# Patient Record
Sex: MALE | Race: WHITE | NOT HISPANIC OR LATINO | Employment: OTHER | URBAN - METROPOLITAN AREA
[De-identification: names, ages, dates, MRNs, and addresses within clinical notes are randomized per-mention and may not be internally consistent; named-entity substitution may affect disease eponyms.]

---

## 2017-01-17 ENCOUNTER — TELEPHONE (OUTPATIENT)
Dept: RHEUMATOLOGY | Facility: CLINIC | Age: 57
End: 2017-01-17

## 2017-01-17 DIAGNOSIS — G47.00 INSOMNIA: ICD-10-CM

## 2017-01-17 DIAGNOSIS — E87.6 LOW BLOOD POTASSIUM: ICD-10-CM

## 2017-01-17 DIAGNOSIS — Z86.39 H/O NON ANEMIC VITAMIN B12 DEFICIENCY: ICD-10-CM

## 2017-01-17 DIAGNOSIS — I10 ESSENTIAL HYPERTENSION: ICD-10-CM

## 2017-01-17 DIAGNOSIS — M06.9 RHEUMATOID ARTHRITIS OF HAND, UNSPECIFIED LATERALITY, UNSPECIFIED RHEUMATOID FACTOR PRESENCE: ICD-10-CM

## 2017-01-17 NOTE — TELEPHONE ENCOUNTER
----- Message from Oliverio Burr sent at 1/17/2017  1:53 PM CST -----  Contact: self   Patient wants to speak with a patient regarding earlier appointment please call back at [phone]

## 2017-01-17 NOTE — TELEPHONE ENCOUNTER
Returned patient's call. Patient scheduled for earlier appointment. Cancellation spot came available.

## 2017-01-18 ENCOUNTER — OFFICE VISIT (OUTPATIENT)
Dept: RHEUMATOLOGY | Facility: CLINIC | Age: 57
End: 2017-01-18
Payer: MEDICARE

## 2017-01-18 VITALS
HEART RATE: 109 BPM | HEIGHT: 66 IN | WEIGHT: 182.13 LBS | DIASTOLIC BLOOD PRESSURE: 87 MMHG | BODY MASS INDEX: 29.27 KG/M2 | SYSTOLIC BLOOD PRESSURE: 129 MMHG

## 2017-01-18 DIAGNOSIS — M06.9 RHEUMATOID ARTHRITIS OF HAND, UNSPECIFIED LATERALITY, UNSPECIFIED RHEUMATOID FACTOR PRESENCE: Primary | ICD-10-CM

## 2017-01-18 DIAGNOSIS — T40.2X5A CONSTIPATION DUE TO OPIOID THERAPY: ICD-10-CM

## 2017-01-18 DIAGNOSIS — R14.0 ABDOMINAL DISTENSION (GASEOUS): ICD-10-CM

## 2017-01-18 DIAGNOSIS — K52.9 GASTROENTERITIS: ICD-10-CM

## 2017-01-18 DIAGNOSIS — K59.03 CONSTIPATION DUE TO OPIOID THERAPY: ICD-10-CM

## 2017-01-18 DIAGNOSIS — R19.5 YEAST IN STOOL: ICD-10-CM

## 2017-01-18 PROCEDURE — 96372 THER/PROPH/DIAG INJ SC/IM: CPT | Mod: PBBFAC,PO

## 2017-01-18 PROCEDURE — 99999 PR PBB SHADOW E&M-EST. PATIENT-LVL III: CPT | Mod: PBBFAC,,, | Performed by: INTERNAL MEDICINE

## 2017-01-18 PROCEDURE — 99215 OFFICE O/P EST HI 40 MIN: CPT | Mod: 25,S$PBB,, | Performed by: INTERNAL MEDICINE

## 2017-01-18 PROCEDURE — 99213 OFFICE O/P EST LOW 20 MIN: CPT | Mod: PBBFAC,PO | Performed by: INTERNAL MEDICINE

## 2017-01-18 RX ORDER — SUCRALFATE 1 G/10ML
2 SUSPENSION ORAL DAILY
COMMUNITY
Start: 2016-12-10 | End: 2017-06-07

## 2017-01-18 RX ORDER — LUBIPROSTONE 24 UG/1
24 CAPSULE ORAL
Qty: 30 CAPSULE | Refills: 11 | Status: SHIPPED | OUTPATIENT
Start: 2017-01-18 | End: 2017-02-17

## 2017-01-18 RX ORDER — LANOLIN ALCOHOL/MO/W.PET/CERES
400 CREAM (GRAM) TOPICAL DAILY
Status: ON HOLD | COMMUNITY
Start: 2016-12-10 | End: 2017-08-13

## 2017-01-18 RX ORDER — TIZANIDINE 4 MG/1
4 TABLET ORAL EVERY 12 HOURS
Qty: 60 TABLET | Refills: 11 | Status: SHIPPED | OUTPATIENT
Start: 2017-01-18 | End: 2018-02-07 | Stop reason: SDUPTHER

## 2017-01-18 RX ORDER — ALBUTEROL SULFATE 90 UG/1
2 AEROSOL, METERED RESPIRATORY (INHALATION)
COMMUNITY
Start: 2015-04-06 | End: 2017-01-18

## 2017-01-18 RX ORDER — BIOTIN 10 MG
1 TABLET ORAL DAILY
Qty: 30 CAPSULE | Refills: 3 | Status: SHIPPED | OUTPATIENT
Start: 2017-01-18 | End: 2018-09-17

## 2017-01-18 RX ORDER — FLUCONAZOLE 150 MG/1
150 TABLET ORAL WEEKLY
Qty: 4 TABLET | Refills: 3 | Status: SHIPPED | OUTPATIENT
Start: 2017-01-18 | End: 2017-01-22

## 2017-01-18 RX ORDER — CYANOCOBALAMIN 1000 UG/ML
1000 INJECTION, SOLUTION INTRAMUSCULAR; SUBCUTANEOUS
Qty: 10 ML | Refills: 3 | Status: SHIPPED | OUTPATIENT
Start: 2017-01-18 | End: 2018-03-19 | Stop reason: SDUPTHER

## 2017-01-18 RX ORDER — CYANOCOBALAMIN 1000 UG/ML
1000 INJECTION, SOLUTION INTRAMUSCULAR; SUBCUTANEOUS
Status: COMPLETED | OUTPATIENT
Start: 2017-01-18 | End: 2017-01-18

## 2017-01-18 RX ORDER — CYANOCOBALAMIN 1000 UG/ML
1000 INJECTION, SOLUTION INTRAMUSCULAR; SUBCUTANEOUS WEEKLY
COMMUNITY
End: 2017-09-13 | Stop reason: SDUPTHER

## 2017-01-18 RX ORDER — ZOLPIDEM TARTRATE 10 MG/1
TABLET ORAL
Qty: 30 TABLET | Refills: 3 | Status: SHIPPED | OUTPATIENT
Start: 2017-01-18 | End: 2017-05-03 | Stop reason: SDUPTHER

## 2017-01-18 RX ORDER — METHYLPREDNISOLONE ACETATE 80 MG/ML
80 INJECTION, SUSPENSION INTRA-ARTICULAR; INTRALESIONAL; INTRAMUSCULAR; SOFT TISSUE
Status: COMPLETED | OUTPATIENT
Start: 2017-01-18 | End: 2017-01-18

## 2017-01-18 RX ORDER — FLUTICASONE FUROATE AND VILANTEROL TRIFENATATE 100; 25 UG/1; UG/1
1 POWDER RESPIRATORY (INHALATION) DAILY
COMMUNITY
Start: 2016-12-12

## 2017-01-18 RX ORDER — POTASSIUM CHLORIDE 750 MG/1
10 TABLET, EXTENDED RELEASE ORAL DAILY
Qty: 30 TABLET | Refills: 6 | Status: SHIPPED | OUTPATIENT
Start: 2017-01-18 | End: 2017-08-14 | Stop reason: SDUPTHER

## 2017-01-18 RX ORDER — ONDANSETRON 8 MG/1
8 TABLET, ORALLY DISINTEGRATING ORAL EVERY 8 HOURS PRN
Qty: 45 TABLET | Refills: 6 | Status: SHIPPED | OUTPATIENT
Start: 2017-01-18 | End: 2019-03-07 | Stop reason: SDUPTHER

## 2017-01-18 RX ORDER — METOCLOPRAMIDE 5 MG/1
5 TABLET ORAL NIGHTLY
COMMUNITY
Start: 2016-12-30 | End: 2017-06-07 | Stop reason: SDUPTHER

## 2017-01-18 RX ADMIN — METHYLPREDNISOLONE ACETATE 80 MG: 80 INJECTION, SUSPENSION INTRA-ARTICULAR; INTRALESIONAL; INTRAMUSCULAR; SOFT TISSUE at 10:01

## 2017-01-18 RX ADMIN — CYANOCOBALAMIN 1000 MCG: 1000 INJECTION, SOLUTION INTRAMUSCULAR at 10:01

## 2017-01-18 NOTE — PROGRESS NOTES
Subjective:       Patient ID: Anoop Womack is a 56 y.o. male.    Chief Complaint: Follow-up (on labs)    HPI Comments:   On humira, he has gastroparesis and admitted x 3  He had nausea, vomiting and ill feeling. Patient complains of arthralgias and myalgias for which has been present for a few years. Pain is located in multiple joints, both shoulder(s), both elbow(s), both wrist(s), both MCP(s): 1st, 2nd, 3rd, 4th and 5th, both PIP(s): 1st, 2nd, 3rd, 4th and 5th, both DIP(s): 1st and 2nd, both hip(s), both knee(s) and both MTP(s): 1st, 2nd, 3rd, 4th and 5th, is described as aching, pulsating, shooting and throbbing, and is constant, moderate .  Associated symptoms include: crepitation, decreased range of motion, edema, effusion, tenderness and warmth.            Rheumatoid Arthritis    The disease course has been fluctuating. The condition has lasted for 10 years.     He complains of joint swelling. He complains of lasting between 30 and 60 minutes after awakening.The left knee and left ankle presents with Arthralgia.    Past treatments include methotrexate, NSAIDs and corticosteroids (azulfidine, plaquenil, enbrel, remicade). The treatment provided moderate relief. Compliance with prior treatments has been good.         Rheumatoid Arthritis        He complains of joint swelling. He complains of lasting between 30 and 60 minutes after awakening.The left knee and left ankle presents with Arthralgia.        Review of Systems   Constitutional: Positive for activity change. Negative for appetite change, chills, diaphoresis and unexpected weight change.   HENT: Negative for congestion, ear pain, facial swelling, mouth sores, nosebleeds, postnasal drip, rhinorrhea, sinus pressure, sneezing, sore throat, tinnitus and voice change.    Eyes: Negative for pain, discharge, redness, itching and visual disturbance.   Respiratory: Negative for apnea, cough, chest tightness, shortness of breath and wheezing.    Cardiovascular:  "Negative for chest pain, palpitations and leg swelling.   Gastrointestinal: Positive for abdominal distention, abdominal pain, constipation, diarrhea, nausea and vomiting.   Endocrine: Negative for cold intolerance, heat intolerance, polydipsia and polyuria.   Genitourinary: Negative for decreased urine volume, difficulty urinating, flank pain, frequency, hematuria and urgency.   Musculoskeletal: Positive for back pain, gait problem, joint swelling, neck pain and neck stiffness. Negative for arthralgias.   Skin: Positive for rash. Negative for pallor and wound.   Allergic/Immunologic: Negative for immunocompromised state.   Neurological: Negative for dizziness, tremors, seizures, syncope, weakness and numbness.   Hematological: Negative for adenopathy. Does not bruise/bleed easily.   Psychiatric/Behavioral: Negative for sleep disturbance and suicidal ideas. The patient is not nervous/anxious.          Objective:     Visit Vitals    /87    Pulse 109    Ht 5' 6" (1.676 m)    Wt 82.6 kg (182 lb 1.6 oz)    BMI 29.39 kg/m2        Physical Exam   Vitals reviewed.  Constitutional: He is oriented to person, place, and time and well-developed, well-nourished, and in no distress.   HENT:   Head: Normocephalic and atraumatic.   Mouth/Throat: Oropharynx is clear and moist.   Eyes: EOM are normal. Pupils are equal, round, and reactive to light.   Neck: Neck supple. No thyromegaly present.   Cardiovascular: Normal rate, regular rhythm and normal heart sounds.  Exam reveals no gallop and no friction rub.    No murmur heard.  Pulmonary/Chest: Breath sounds normal. He has no wheezes. He has no rales. He exhibits no tenderness.   Abdominal: There is no tenderness. There is no rebound and no guarding.       Right Side Rheumatological Exam     The patient is tender to palpation of the shoulder, elbow, wrist, knee, 1st PIP, 1st MCP, 2nd PIP, 2nd MCP, 3rd PIP, 3rd MCP, 4th PIP, 4th MCP and 5th PIP    He has swelling of the " elbow, wrist, knee, 1st PIP, 1st MCP, 2nd PIP, 2nd MCP, 3rd PIP, 3rd MCP, 4th PIP, 4th MCP, 5th PIP and 5th MCP    The patient has an enlarged wrist and knee    Shoulder Exam   Tenderness Location: no tenderness    Range of Motion   Active Abduction: abnormal   Adduction: abnormal  Sensation: normal    Knee Exam   Tenderness Location: medial joint line and LCL  Patellofemoral Crepitus: positive  Effusion: positive  Sensation: normal    Hip Exam   Tenderness Location: posterior and anterior  Sensation: normal    Elbow/Wrist Exam   Tenderness Location: no tenderness  Sensation: normal    Left Side Rheumatological Exam     The patient is tender to palpation of the shoulder, elbow, wrist, knee, 1st PIP, 1st MCP, 2nd PIP, 2nd MCP, 3rd PIP, 3rd MCP, 4th PIP, 4th MCP, 5th PIP and 5th MCP.    He has swelling of the wrist, knee, 1st PIP, 1st MCP, 2nd PIP, 2nd MCP, 3rd PIP, 3rd MCP, 4th PIP, 4th MCP, 5th PIP and 5th MCP    The patient has an enlarged knee.    Shoulder Exam   Tenderness Location: no tenderness    Range of Motion   Active Abduction: abnormal   Sensation: normal    Knee Exam   Tenderness Location: lateral joint line and medial joint line    Patellofemoral Crepitus: positive  Effusion: positive  Sensation: normal    Hip Exam   Tenderness Location: posterior and anterior  Sensation: normal    Elbow/Wrist Exam   Sensation: normal      Back/Neck Exam   General Inspection   Gait: normal       Tenderness Right paramedian tenderness of the Upper C-Spine, Lower C-Spine, Lower L-Spine and SI Joint.Left paramedian tenderness of the Upper C-Spine, Lower L-Spine, Lower C-Spine and SI Joint.    Neck Range of Motion   Flexion: Limited  Extension: Limited  Lymphadenopathy:     He has no cervical adenopathy.   Neurological: He is alert and oriented to person, place, and time. Gait normal.   Skin: No rash noted. No erythema. No pallor.     Psychiatric: Mood and affect normal.   Musculoskeletal: He exhibits tenderness and  deformity. He exhibits no edema.               Results for orders placed or performed in visit on 08/08/16   Quantiferon Gold TB   Result Value Ref Range    NIL 0.06 See text IU/mL    TB Antigen 0.05 See text IU/mL    TB Antigen - Nil -0.01 See text IU/mL    Mitogen - Nil 1.72 See text IU/mL    TB Gold Negative        Assessment:         Encounter Diagnoses   Name Primary?    Rheumatoid arthritis of hand, unspecified laterality, unspecified rheumatoid factor presence Yes    Gastroenteritis     Abdominal distension (gaseous)     Constipation due to opioid therapy     Yeast in stool          Plan:       Rheumatoid arthritis of hand, unspecified laterality, unspecified rheumatoid factor presence  -     lubiprostone (AMITIZA) 24 MCG Cap; Take 1 capsule (24 mcg total) by mouth daily with breakfast.  Dispense: 30 capsule; Refill: 11  -     ondansetron (ZOFRAN-ODT) 8 MG TbDL; Take 1 tablet (8 mg total) by mouth every 8 (eight) hours as needed.  Dispense: 45 tablet; Refill: 6  -     tizanidine (ZANAFLEX) 4 MG tablet; Take 1 tablet (4 mg total) by mouth every 12 (twelve) hours.  Dispense: 60 tablet; Refill: 11  -     fluconazole (DIFLUCAN) 150 MG Tab; Take 1 tablet (150 mg total) by mouth once a week. Treat yeast  Dispense: 4 tablet; Refill: 3  -     potassium chloride SA (K-DUR,KLOR-CON) 10 MEQ tablet; Take 1 tablet (10 mEq total) by mouth once daily.  Dispense: 30 tablet; Refill: 6  -     cyanocobalamin 1,000 mcg/mL injection; Inject 1 mL (1,000 mcg total) into the skin every 7 days.  Dispense: 10 mL; Refill: 3  -     acidophilus-pectin, citrus (PROBIOTIC ACIDOPHILUS-PECTIN) 100 million cell-10 mg Cap; Take 1 tablet by mouth once daily.  Dispense: 30 capsule; Refill: 3  -     methylPREDNISolone acetate injection 80 mg; Inject 1 mL (80 mg total) into the muscle one time.  -     cyanocobalamin injection 1,000 mcg; Inject 1 mL (1,000 mcg total) into the muscle one time.  -     Comprehensive metabolic panel; Future;  Expected date: 1/18/17  -     CBC auto differential; Future; Expected date: 1/18/17  -     Sedimentation rate, manual; Future; Expected date: 1/18/17  -     C-reactive protein; Future; Expected date: 1/18/17  -     Rheumatoid factor; Future; Expected date: 1/18/17  -     Cyclic citrul peptide antibody, IgG; Future; Expected date: 1/18/17    Gastroenteritis  -     lubiprostone (AMITIZA) 24 MCG Cap; Take 1 capsule (24 mcg total) by mouth daily with breakfast.  Dispense: 30 capsule; Refill: 11  -     ondansetron (ZOFRAN-ODT) 8 MG TbDL; Take 1 tablet (8 mg total) by mouth every 8 (eight) hours as needed.  Dispense: 45 tablet; Refill: 6  -     tizanidine (ZANAFLEX) 4 MG tablet; Take 1 tablet (4 mg total) by mouth every 12 (twelve) hours.  Dispense: 60 tablet; Refill: 11  -     fluconazole (DIFLUCAN) 150 MG Tab; Take 1 tablet (150 mg total) by mouth once a week. Treat yeast  Dispense: 4 tablet; Refill: 3  -     potassium chloride SA (K-DUR,KLOR-CON) 10 MEQ tablet; Take 1 tablet (10 mEq total) by mouth once daily.  Dispense: 30 tablet; Refill: 6  -     cyanocobalamin 1,000 mcg/mL injection; Inject 1 mL (1,000 mcg total) into the skin every 7 days.  Dispense: 10 mL; Refill: 3  -     acidophilus-pectin, citrus (PROBIOTIC ACIDOPHILUS-PECTIN) 100 million cell-10 mg Cap; Take 1 tablet by mouth once daily.  Dispense: 30 capsule; Refill: 3  -     methylPREDNISolone acetate injection 80 mg; Inject 1 mL (80 mg total) into the muscle one time.  -     cyanocobalamin injection 1,000 mcg; Inject 1 mL (1,000 mcg total) into the muscle one time.  -     Comprehensive metabolic panel; Future; Expected date: 1/18/17  -     CBC auto differential; Future; Expected date: 1/18/17  -     Sedimentation rate, manual; Future; Expected date: 1/18/17  -     C-reactive protein; Future; Expected date: 1/18/17  -     Rheumatoid factor; Future; Expected date: 1/18/17  -     Cyclic citrul peptide antibody, IgG; Future; Expected date: 1/18/17    Abdominal  distension (gaseous)  -     lubiprostone (AMITIZA) 24 MCG Cap; Take 1 capsule (24 mcg total) by mouth daily with breakfast.  Dispense: 30 capsule; Refill: 11  -     ondansetron (ZOFRAN-ODT) 8 MG TbDL; Take 1 tablet (8 mg total) by mouth every 8 (eight) hours as needed.  Dispense: 45 tablet; Refill: 6  -     tizanidine (ZANAFLEX) 4 MG tablet; Take 1 tablet (4 mg total) by mouth every 12 (twelve) hours.  Dispense: 60 tablet; Refill: 11  -     fluconazole (DIFLUCAN) 150 MG Tab; Take 1 tablet (150 mg total) by mouth once a week. Treat yeast  Dispense: 4 tablet; Refill: 3  -     potassium chloride SA (K-DUR,KLOR-CON) 10 MEQ tablet; Take 1 tablet (10 mEq total) by mouth once daily.  Dispense: 30 tablet; Refill: 6  -     cyanocobalamin 1,000 mcg/mL injection; Inject 1 mL (1,000 mcg total) into the skin every 7 days.  Dispense: 10 mL; Refill: 3  -     acidophilus-pectin, citrus (PROBIOTIC ACIDOPHILUS-PECTIN) 100 million cell-10 mg Cap; Take 1 tablet by mouth once daily.  Dispense: 30 capsule; Refill: 3  -     methylPREDNISolone acetate injection 80 mg; Inject 1 mL (80 mg total) into the muscle one time.  -     cyanocobalamin injection 1,000 mcg; Inject 1 mL (1,000 mcg total) into the muscle one time.  -     Comprehensive metabolic panel; Future; Expected date: 1/18/17  -     CBC auto differential; Future; Expected date: 1/18/17  -     Sedimentation rate, manual; Future; Expected date: 1/18/17  -     C-reactive protein; Future; Expected date: 1/18/17  -     Rheumatoid factor; Future; Expected date: 1/18/17  -     Cyclic citrul peptide antibody, IgG; Future; Expected date: 1/18/17    Constipation due to opioid therapy  -     lubiprostone (AMITIZA) 24 MCG Cap; Take 1 capsule (24 mcg total) by mouth daily with breakfast.  Dispense: 30 capsule; Refill: 11  -     ondansetron (ZOFRAN-ODT) 8 MG TbDL; Take 1 tablet (8 mg total) by mouth every 8 (eight) hours as needed.  Dispense: 45 tablet; Refill: 6  -     tizanidine (ZANAFLEX) 4  MG tablet; Take 1 tablet (4 mg total) by mouth every 12 (twelve) hours.  Dispense: 60 tablet; Refill: 11  -     fluconazole (DIFLUCAN) 150 MG Tab; Take 1 tablet (150 mg total) by mouth once a week. Treat yeast  Dispense: 4 tablet; Refill: 3  -     potassium chloride SA (K-DUR,KLOR-CON) 10 MEQ tablet; Take 1 tablet (10 mEq total) by mouth once daily.  Dispense: 30 tablet; Refill: 6  -     cyanocobalamin 1,000 mcg/mL injection; Inject 1 mL (1,000 mcg total) into the skin every 7 days.  Dispense: 10 mL; Refill: 3  -     acidophilus-pectin, citrus (PROBIOTIC ACIDOPHILUS-PECTIN) 100 million cell-10 mg Cap; Take 1 tablet by mouth once daily.  Dispense: 30 capsule; Refill: 3  -     methylPREDNISolone acetate injection 80 mg; Inject 1 mL (80 mg total) into the muscle one time.  -     cyanocobalamin injection 1,000 mcg; Inject 1 mL (1,000 mcg total) into the muscle one time.  -     Comprehensive metabolic panel; Future; Expected date: 1/18/17  -     CBC auto differential; Future; Expected date: 1/18/17  -     Sedimentation rate, manual; Future; Expected date: 1/18/17  -     C-reactive protein; Future; Expected date: 1/18/17  -     Rheumatoid factor; Future; Expected date: 1/18/17  -     Cyclic citrul peptide antibody, IgG; Future; Expected date: 1/18/17    Yeast in stool  -     ondansetron (ZOFRAN-ODT) 8 MG TbDL; Take 1 tablet (8 mg total) by mouth every 8 (eight) hours as needed.  Dispense: 45 tablet; Refill: 6  -     tizanidine (ZANAFLEX) 4 MG tablet; Take 1 tablet (4 mg total) by mouth every 12 (twelve) hours.  Dispense: 60 tablet; Refill: 11  -     fluconazole (DIFLUCAN) 150 MG Tab; Take 1 tablet (150 mg total) by mouth once a week. Treat yeast  Dispense: 4 tablet; Refill: 3  -     potassium chloride SA (K-DUR,KLOR-CON) 10 MEQ tablet; Take 1 tablet (10 mEq total) by mouth once daily.  Dispense: 30 tablet; Refill: 6  -     cyanocobalamin 1,000 mcg/mL injection; Inject 1 mL (1,000 mcg total) into the skin every 7 days.   Dispense: 10 mL; Refill: 3  -     acidophilus-pectin, citrus (PROBIOTIC ACIDOPHILUS-PECTIN) 100 million cell-10 mg Cap; Take 1 tablet by mouth once daily.  Dispense: 30 capsule; Refill: 3  -     methylPREDNISolone acetate injection 80 mg; Inject 1 mL (80 mg total) into the muscle one time.  -     cyanocobalamin injection 1,000 mcg; Inject 1 mL (1,000 mcg total) into the muscle one time.  -     Comprehensive metabolic panel; Future; Expected date: 1/18/17  -     CBC auto differential; Future; Expected date: 1/18/17  -     Sedimentation rate, manual; Future; Expected date: 1/18/17  -     C-reactive protein; Future; Expected date: 1/18/17  -     Rheumatoid factor; Future; Expected date: 1/18/17  -     Cyclic citrul peptide antibody, IgG; Future; Expected date: 1/18/17

## 2017-01-18 NOTE — PROGRESS NOTES
Administered 1 cc ( 1000 mcg/ml ) of b 12 to the left upper outer gluteal. Informed of s/s to report verbalized understanding. No adverse reactions noted.    Lot # [id number]  Expiration may 18    Administered 1 cc ( 80 mg/ml ) of depomedrol to the left upper outer gluteal. Informed of s/s to report verbalized understanding. No adverse reactions noted.    Lot # [id number]  Expiration 10/2017

## 2017-04-19 NOTE — TELEPHONE ENCOUNTER
----- Message from Elizabeth Torres sent at 4/19/2017 10:51 AM CDT -----  Contact: self  Patient called regarding his medications that were prescribed. Causing issues to get pain medications. Stating urgent. Please contact [phone] (home)

## 2017-04-20 DIAGNOSIS — M06.9 RHEUMATOID ARTHRITIS, INVOLVING UNSPECIFIED SITE, UNSPECIFIED RHEUMATOID FACTOR PRESENCE: Primary | ICD-10-CM

## 2017-04-20 RX ORDER — OXYCODONE AND ACETAMINOPHEN 10; 325 MG/1; MG/1
1 TABLET ORAL EVERY 8 HOURS PRN
Status: CANCELLED | OUTPATIENT
Start: 2017-04-20

## 2017-04-20 RX ORDER — MORPHINE SULFATE 30 MG/1
30 TABLET, FILM COATED, EXTENDED RELEASE ORAL 2 TIMES DAILY
Status: CANCELLED | OUTPATIENT
Start: 2017-04-20

## 2017-04-20 NOTE — TELEPHONE ENCOUNTER
----- Message from Belinda Domingo sent at 4/20/2017  2:15 PM CDT -----  Pt states urgent that he speak to nurse about his refills ... Call [phone]

## 2017-05-03 DIAGNOSIS — E87.6 LOW BLOOD POTASSIUM: ICD-10-CM

## 2017-05-03 DIAGNOSIS — I10 ESSENTIAL HYPERTENSION: ICD-10-CM

## 2017-05-03 DIAGNOSIS — M06.9 RHEUMATOID ARTHRITIS OF HAND, UNSPECIFIED LATERALITY, UNSPECIFIED RHEUMATOID FACTOR PRESENCE: ICD-10-CM

## 2017-05-03 DIAGNOSIS — Z86.39 H/O NON ANEMIC VITAMIN B12 DEFICIENCY: ICD-10-CM

## 2017-05-03 DIAGNOSIS — G47.00 INSOMNIA: ICD-10-CM

## 2017-05-10 RX ORDER — ZOLPIDEM TARTRATE 10 MG/1
TABLET ORAL
Qty: 30 TABLET | Refills: 3 | Status: SHIPPED | OUTPATIENT
Start: 2017-05-10 | End: 2018-05-22

## 2017-05-15 DIAGNOSIS — Z86.39 H/O NON ANEMIC VITAMIN B12 DEFICIENCY: ICD-10-CM

## 2017-05-15 DIAGNOSIS — M06.9 RHEUMATOID ARTHRITIS OF HAND, UNSPECIFIED LATERALITY, UNSPECIFIED RHEUMATOID FACTOR PRESENCE: ICD-10-CM

## 2017-05-15 DIAGNOSIS — I10 ESSENTIAL HYPERTENSION: ICD-10-CM

## 2017-05-15 DIAGNOSIS — E87.6 LOW BLOOD POTASSIUM: ICD-10-CM

## 2017-05-15 DIAGNOSIS — G47.00 INSOMNIA: ICD-10-CM

## 2017-05-15 RX ORDER — PROMETHAZINE HYDROCHLORIDE 25 MG/1
TABLET ORAL
Qty: 60 TABLET | Refills: 0 | Status: SHIPPED | OUTPATIENT
Start: 2017-05-15 | End: 2017-06-26 | Stop reason: SDUPTHER

## 2017-05-15 RX ORDER — ZOLPIDEM TARTRATE 10 MG/1
TABLET ORAL
Qty: 30 TABLET | Refills: 3 | Status: SHIPPED | OUTPATIENT
Start: 2017-05-15 | End: 2017-09-13 | Stop reason: SDUPTHER

## 2017-06-07 ENCOUNTER — OFFICE VISIT (OUTPATIENT)
Dept: RHEUMATOLOGY | Facility: CLINIC | Age: 57
End: 2017-06-07
Payer: MEDICARE

## 2017-06-07 VITALS
BODY MASS INDEX: 28.85 KG/M2 | SYSTOLIC BLOOD PRESSURE: 119 MMHG | DIASTOLIC BLOOD PRESSURE: 81 MMHG | HEIGHT: 66 IN | WEIGHT: 179.5 LBS | HEART RATE: 81 BPM

## 2017-06-07 DIAGNOSIS — Z86.39 H/O NON ANEMIC VITAMIN B12 DEFICIENCY: ICD-10-CM

## 2017-06-07 DIAGNOSIS — E87.6 LOW BLOOD POTASSIUM: ICD-10-CM

## 2017-06-07 DIAGNOSIS — M06.9 RHEUMATOID ARTHRITIS, INVOLVING UNSPECIFIED SITE, UNSPECIFIED RHEUMATOID FACTOR PRESENCE: Primary | ICD-10-CM

## 2017-06-07 DIAGNOSIS — I10 ESSENTIAL HYPERTENSION: ICD-10-CM

## 2017-06-07 DIAGNOSIS — M25.552 PAIN OF BOTH HIP JOINTS: ICD-10-CM

## 2017-06-07 DIAGNOSIS — M25.551 PAIN OF BOTH HIP JOINTS: ICD-10-CM

## 2017-06-07 DIAGNOSIS — S32.010A COMPRESSION FRACTURE OF FIRST LUMBAR VERTEBRA, CLOSED, INITIAL ENCOUNTER: ICD-10-CM

## 2017-06-07 DIAGNOSIS — Z12.5 ENCOUNTER FOR SCREENING FOR MALIGNANT NEOPLASM OF PROSTATE: ICD-10-CM

## 2017-06-07 DIAGNOSIS — K52.9 GASTROENTERITIS: ICD-10-CM

## 2017-06-07 DIAGNOSIS — M87.00 AVN (AVASCULAR NECROSIS OF BONE): ICD-10-CM

## 2017-06-07 DIAGNOSIS — M80.88XP: ICD-10-CM

## 2017-06-07 DIAGNOSIS — M72.0 DUPUYTREN'S CONTRACTURE OF BOTH HANDS: ICD-10-CM

## 2017-06-07 PROCEDURE — 99214 OFFICE O/P EST MOD 30 MIN: CPT | Mod: S$PBB,,, | Performed by: INTERNAL MEDICINE

## 2017-06-07 PROCEDURE — 99999 PR PBB SHADOW E&M-EST. PATIENT-LVL V: CPT | Mod: PBBFAC,,, | Performed by: INTERNAL MEDICINE

## 2017-06-07 PROCEDURE — 96372 THER/PROPH/DIAG INJ SC/IM: CPT | Mod: PBBFAC,PO

## 2017-06-07 PROCEDURE — 99215 OFFICE O/P EST HI 40 MIN: CPT | Mod: PBBFAC,PO | Performed by: INTERNAL MEDICINE

## 2017-06-07 RX ORDER — METOCLOPRAMIDE 10 MG/1
10 TABLET ORAL NIGHTLY
Qty: 30 TABLET | Refills: 11 | Status: ON HOLD | OUTPATIENT
Start: 2017-06-07 | End: 2017-08-13

## 2017-06-07 RX ORDER — KETOROLAC TROMETHAMINE 30 MG/ML
60 INJECTION, SOLUTION INTRAMUSCULAR; INTRAVENOUS
Status: COMPLETED | OUTPATIENT
Start: 2017-06-07 | End: 2017-06-07

## 2017-06-07 RX ADMIN — KETOROLAC TROMETHAMINE 60 MG: 30 INJECTION, SOLUTION INTRAMUSCULAR; INTRAVENOUS at 01:06

## 2017-06-07 NOTE — PROGRESS NOTES
Subjective:       Patient ID: Anoop Womack is a 56 y.o. male.    Chief Complaint: Follow-up      Follow up: On humira and still has gastroparesis., Pain is located in multiple joints, both shoulder(s), both elbow(s), both wrist(s), both MCP(s): 1st, 2nd, 3rd, 4th and 5th, both PIP(s): 1st, 2nd, 3rd, 4th and 5th, both DIP(s): 1st and 2nd, both hip(s), both knee(s) and both MTP(s): 1st, 2nd, 3rd, 4th and 5th, is described as aching, pulsating, shooting and throbbing, and is constant, moderate .  Associated symptoms include: crepitation, decreased range of motion, edema, effusion, tenderness and warmth.   Pt had a bull who attack him 2012, he had B wrist fractures likely had compression fractures from the attack, also noted L hip avn of ct of the abd and distrophic calcification in the prostate.      Review of Systems   Constitutional: Positive for activity change. Negative for appetite change, chills, diaphoresis and unexpected weight change.   HENT: Negative for congestion, ear pain, facial swelling, mouth sores, nosebleeds, postnasal drip, rhinorrhea, sinus pressure, sneezing, sore throat, tinnitus and voice change.    Eyes: Negative for pain, discharge, redness, itching and visual disturbance.   Respiratory: Negative for apnea, cough, chest tightness, shortness of breath and wheezing.    Cardiovascular: Negative for chest pain, palpitations and leg swelling.   Gastrointestinal: Positive for abdominal distention, abdominal pain, constipation, diarrhea, nausea and vomiting.   Endocrine: Negative for cold intolerance, heat intolerance, polydipsia and polyuria.   Genitourinary: Negative for decreased urine volume, difficulty urinating, flank pain, frequency, hematuria and urgency.   Musculoskeletal: Positive for back pain, gait problem, neck pain and neck stiffness. Negative for arthralgias.   Skin: Positive for rash. Negative for pallor and wound.   Allergic/Immunologic: Negative for immunocompromised state.  "  Neurological: Negative for dizziness, tremors, seizures, syncope, weakness and numbness.   Hematological: Negative for adenopathy. Does not bruise/bleed easily.   Psychiatric/Behavioral: Negative for sleep disturbance and suicidal ideas. The patient is not nervous/anxious.          Objective:     /81   Pulse 81   Ht 5' 6" (1.676 m)   Wt 81.4 kg (179 lb 8 oz)   BMI 28.97 kg/m²      Physical Exam   Vitals reviewed.  Constitutional: He is oriented to person, place, and time and well-developed, well-nourished, and in no distress.   HENT:   Head: Normocephalic and atraumatic.   Mouth/Throat: Oropharynx is clear and moist.   Eyes: EOM are normal. Pupils are equal, round, and reactive to light.   Neck: Neck supple. No thyromegaly present.   Cardiovascular: Normal rate, regular rhythm and normal heart sounds.  Exam reveals no gallop and no friction rub.    No murmur heard.  Pulmonary/Chest: Breath sounds normal. He has no wheezes. He has no rales. He exhibits no tenderness.   Abdominal: There is no tenderness. There is no rebound and no guarding.       Right Side Rheumatological Exam     The patient is tender to palpation of the shoulder, elbow, wrist, knee, 1st PIP, 1st MCP, 2nd PIP, 2nd MCP, 3rd PIP, 3rd MCP, 4th PIP, 4th MCP and 5th PIP    He has swelling of the elbow, wrist, knee, 1st PIP, 1st MCP, 2nd PIP, 2nd MCP, 3rd PIP, 3rd MCP, 4th PIP, 4th MCP, 5th PIP and 5th MCP    The patient has an enlarged wrist and knee    Shoulder Exam   Tenderness Location: no tenderness    Range of Motion   Active Abduction: abnormal   Adduction: abnormal  Sensation: normal    Knee Exam   Tenderness Location: medial joint line and LCL  Patellofemoral Crepitus: positive  Effusion: positive  Sensation: normal    Hip Exam   Tenderness Location: posterior and anterior  Sensation: normal    Elbow/Wrist Exam   Tenderness Location: no tenderness  Sensation: normal    Left Side Rheumatological Exam     The patient is tender to " palpation of the shoulder, elbow, wrist, knee, 1st PIP, 1st MCP, 2nd PIP, 2nd MCP, 3rd PIP, 3rd MCP, 4th PIP, 4th MCP, 5th PIP and 5th MCP.    He has swelling of the wrist, knee, 1st PIP, 1st MCP, 2nd PIP, 2nd MCP, 3rd PIP, 3rd MCP, 4th PIP, 4th MCP, 5th PIP and 5th MCP    The patient has an enlarged knee.    Shoulder Exam   Tenderness Location: no tenderness    Range of Motion   Active Abduction: abnormal   Sensation: normal    Knee Exam   Tenderness Location: lateral joint line and medial joint line    Patellofemoral Crepitus: positive  Effusion: positive  Sensation: normal    Hip Exam   Tenderness Location: posterior and anterior  Sensation: normal    Elbow/Wrist Exam   Sensation: normal      Back/Neck Exam   General Inspection   Gait: normal       Tenderness Right paramedian tenderness of the Upper C-Spine, Lower C-Spine, Lower L-Spine and SI Joint.Left paramedian tenderness of the Upper C-Spine, Lower L-Spine, Lower C-Spine and SI Joint.    Neck Range of Motion   Flexion: Limited  Extension: Limited  Lymphadenopathy:     He has no cervical adenopathy.   Neurological: He is alert and oriented to person, place, and time. Gait normal.   Skin: No rash noted. No erythema. No pallor.     Psychiatric: Mood and affect normal.   Musculoskeletal: He exhibits tenderness and deformity. He exhibits no edema.   L hip pain and si joint pain          REASON FOR EXAM: [M51.16]-Intervertebral disc disorders with radiculopathy, lumbar region / [M47.27]-Other spondylosis with radiculopathy, lumbosacral region      TECHNICAL FACTORS: 7 view(s)    COMPARISON: J 21st 2012    FINDINGS: There is a moderate compression fracture of T12 and L1. This is a change from the previous study. There is moderate loss of disc height throughout the lumbar spine. There is no evidence of subluxation or instability with flexion and extension.   There is facet arthritis from L4 to S1.     IMPRESSION:   1.  Interval development of compression fractures  of T12 and L1.  2. Multilevel degenerative disc disease with worsening.  3. Facet arthritis from L4 to S1.    Approved by SINAI Martinez on 5/15/2017 12:08 PM    Electronically signed by Mic Fernandes MD on 5/15/2017 2:30 PM  Results for orders placed or performed in visit on 08/08/16   Quantiferon Gold TB   Result Value Ref Range    NIL 0.06 See text IU/mL    TB Antigen 0.05 See text IU/mL    TB Antigen - Nil -0.01 See text IU/mL    Mitogen - Nil 1.72 See text IU/mL    TB Gold Negative      REASON FOR EXAM: Previous CT scan of abdomen and pelvis demonstrated thickening of the descending and rectosigmoid area;     TECHNICAL FACTORS: Multiple contiguous axial CT images were obtained of the abdomen and pelvis without administration of intravenous contrast. 2D reformatted images were performed. Automated exposure control was utilized for radiation dose reduction.    COMPARISON: CT of the abdomen and pelvis December 7, 2016    FINDINGS minimal dependent changes of the lung bases. The base the heart is not enlarged and there is no pericardial fluid.    The gastrointestinal tract is partially opacified with contrast medium. There is no evidence of bowel obstruction or inflammation. Sigmoid colon appears decompressed. However, there is no identifiable bowel wall thickening. Status post cholecystectomy.   The unenhanced liver, spleen, pancreas, adrenal glands, and kidneys demonstrate no abnormality.    Incidental note of an omental fat-containing umbilical hernia. There is no free air fluid in the abdomen or pelvis. Mild calcified abdominal aortic plaques without aneurysm. No abnormal lymphadenopathy. Urinary bladder is unremarkable. There are   dystrophic calcifications within the prostate. There are degenerative changes of the vertebral column with a remote L1 compression fracture anteriorly again noted. Serpiginous sclerosis in the left femoral head consistent with avascular necrosis. No   evidence of  subchondral collapse.       IMPRESSION:   1.  No acute findings.    2. Left femoral head avascular necrosis without evidence of subchondral collapse.  3. Remote L1 compression fracture.   4. Status post cholecystectomy.           Electronically signed by Hayden Jerome MD on 3/27/2017 5:41 PM    Assessment:         Encounter Diagnoses   Name Primary?    Rheumatoid arthritis, involving unspecified site, unspecified rheumatoid factor presence Yes    Gastroenteritis     AVN (avascular necrosis of bone)     Comment:  we will decreases prednisone to 5 mg to help with help    Essential hypertension     Low blood potassium     H/O non anemic vitamin B12 deficiency     Dupuytren's contracture of both hands     Pain of both hip joints     Compression fracture of first lumbar vertebra, closed, initial encounter     Encounter for screening for malignant neoplasm of prostate      Other osteoporosis with current pathological fracture, vertebra(e), subsequent encounter for fracture with malunion           Plan:       Rheumatoid arthritis, involving unspecified site, unspecified rheumatoid factor presence  -     metoclopramide HCl (REGLAN) 10 MG tablet; Take 1 tablet (10 mg total) by mouth every evening. Help GI to move  Dispense: 30 tablet; Refill: 11  -     adalimumab (HUMIRA PEN) PnKt injection; Inject 0.8 mLs (40 mg total) into the skin every 14 (fourteen) days.  Dispense: 1.6 mL; Refill: 11  -     TESTOSTERONE; Future; Expected date: 06/07/2017  -     PSA, SCREENING; Future; Expected date: 06/07/2017  -     PTH, intact; Future; Expected date: 06/07/2017  -     VITAMIN D; Future; Expected date: 06/07/2017  -     CALCIUM, IONIZED; Future; Expected date: 06/07/2017  -     DXA Bone Density Spine And Hip; Future; Expected date: 06/07/2017    Gastroenteritis  -     metoclopramide HCl (REGLAN) 10 MG tablet; Take 1 tablet (10 mg total) by mouth every evening. Help GI to move  Dispense: 30 tablet; Refill: 11  -      adalimumab (HUMIRA PEN) PnKt injection; Inject 0.8 mLs (40 mg total) into the skin every 14 (fourteen) days.  Dispense: 1.6 mL; Refill: 11  -     TESTOSTERONE; Future; Expected date: 06/07/2017  -     PSA, SCREENING; Future; Expected date: 06/07/2017  -     PTH, intact; Future; Expected date: 06/07/2017  -     VITAMIN D; Future; Expected date: 06/07/2017  -     CALCIUM, IONIZED; Future; Expected date: 06/07/2017  -     DXA Bone Density Spine And Hip; Future; Expected date: 06/07/2017    AVN (avascular necrosis of bone)  Comments:   we will decreases prednisone to 5 mg to help with help  Orders:  -     metoclopramide HCl (REGLAN) 10 MG tablet; Take 1 tablet (10 mg total) by mouth every evening. Help GI to move  Dispense: 30 tablet; Refill: 11  -     adalimumab (HUMIRA PEN) PnKt injection; Inject 0.8 mLs (40 mg total) into the skin every 14 (fourteen) days.  Dispense: 1.6 mL; Refill: 11  -     TESTOSTERONE; Future; Expected date: 06/07/2017  -     PSA, SCREENING; Future; Expected date: 06/07/2017  -     PTH, intact; Future; Expected date: 06/07/2017  -     VITAMIN D; Future; Expected date: 06/07/2017  -     CALCIUM, IONIZED; Future; Expected date: 06/07/2017  -     DXA Bone Density Spine And Hip; Future; Expected date: 06/07/2017  -     X-Ray Hips Bilateral 2 View Inc AP Pelvis; Future; Expected date: 06/07/2017    Essential hypertension  -     adalimumab (HUMIRA PEN) PnKt injection; Inject 0.8 mLs (40 mg total) into the skin every 14 (fourteen) days.  Dispense: 1.6 mL; Refill: 11    Low blood potassium  -     adalimumab (HUMIRA PEN) PnKt injection; Inject 0.8 mLs (40 mg total) into the skin every 14 (fourteen) days.  Dispense: 1.6 mL; Refill: 11    H/O non anemic vitamin B12 deficiency  -     adalimumab (HUMIRA PEN) PnKt injection; Inject 0.8 mLs (40 mg total) into the skin every 14 (fourteen) days.  Dispense: 1.6 mL; Refill: 11    Dupuytren's contracture of both hands  -     adalimumab (HUMIRA PEN) PnKt injection;  Inject 0.8 mLs (40 mg total) into the skin every 14 (fourteen) days.  Dispense: 1.6 mL; Refill: 11    Pain of both hip joints  -     X-Ray Hips Bilateral 2 View Inc AP Pelvis; Future; Expected date: 06/07/2017    Compression fracture of first lumbar vertebra, closed, initial encounter  -     metoclopramide HCl (REGLAN) 10 MG tablet; Take 1 tablet (10 mg total) by mouth every evening. Help GI to move  Dispense: 30 tablet; Refill: 11  -     adalimumab (HUMIRA PEN) PnKt injection; Inject 0.8 mLs (40 mg total) into the skin every 14 (fourteen) days.  Dispense: 1.6 mL; Refill: 11  -     TESTOSTERONE; Future; Expected date: 06/07/2017  -     PSA, SCREENING; Future; Expected date: 06/07/2017  -     PTH, intact; Future; Expected date: 06/07/2017  -     VITAMIN D; Future; Expected date: 06/07/2017  -     CALCIUM, IONIZED; Future; Expected date: 06/07/2017  -     DXA Bone Density Spine And Hip; Future; Expected date: 06/07/2017  -     X-Ray Hips Bilateral 2 View Inc AP Pelvis; Future; Expected date: 06/07/2017    Encounter for screening for malignant neoplasm of prostate   -     PSA, SCREENING; Future; Expected date: 06/07/2017    Other osteoporosis with current pathological fracture, vertebra(e), subsequent encounter for fracture with malunion   -     metoclopramide HCl (REGLAN) 10 MG tablet; Take 1 tablet (10 mg total) by mouth every evening. Help GI to move  Dispense: 30 tablet; Refill: 11  -     adalimumab (HUMIRA PEN) PnKt injection; Inject 0.8 mLs (40 mg total) into the skin every 14 (fourteen) days.  Dispense: 1.6 mL; Refill: 11  -     TESTOSTERONE; Future; Expected date: 06/07/2017  -     PSA, SCREENING; Future; Expected date: 06/07/2017  -     PTH, intact; Future; Expected date: 06/07/2017  -     VITAMIN D; Future; Expected date: 06/07/2017  -     CALCIUM, IONIZED; Future; Expected date: 06/07/2017  -     DXA Bone Density Spine And Hip; Future; Expected date: 06/07/2017  -     X-Ray Hips Bilateral 2 View Inc AP  Pelvis; Future; Expected date: 06/07/2017    Other orders  -     ketorolac injection 60 mg; Inject 60 mg into the muscle one time.

## 2017-06-07 NOTE — PROGRESS NOTES
Administered 2 cc ( 30 mg/ml ) of toradol to the left upper outer gluteal. Informed of s/s to report verbalized understanding. No adverse reactions noted.    Lot # [id number]  Expiration 1 oct 2018

## 2017-06-07 NOTE — PATIENT INSTRUCTIONS
REASON FOR EXAM: Previous CT scan of abdomen and pelvis demonstrated thickening of the descending and rectosigmoid area;     TECHNICAL FACTORS: Multiple contiguous axial CT images were obtained of the abdomen and pelvis without administration of intravenous contrast. 2D reformatted images were performed. Automated exposure control was utilized for radiation dose reduction.    COMPARISON: CT of the abdomen and pelvis December 7, 2016    FINDINGS minimal dependent changes of the lung bases. The base the heart is not enlarged and there is no pericardial fluid.    The gastrointestinal tract is partially opacified with contrast medium. There is no evidence of bowel obstruction or inflammation. Sigmoid colon appears decompressed. However, there is no identifiable bowel wall thickening. Status post cholecystectomy.   The unenhanced liver, spleen, pancreas, adrenal glands, and kidneys demonstrate no abnormality.    Incidental note of an omental fat-containing umbilical hernia. There is no free air fluid in the abdomen or pelvis. Mild calcified abdominal aortic plaques without aneurysm. No abnormal lymphadenopathy. Urinary bladder is unremarkable. There are   dystrophic calcifications within the prostate. There are degenerative changes of the vertebral column with a remote L1 compression fracture anteriorly again noted. Serpiginous sclerosis in the left femoral head consistent with avascular necrosis. No   evidence of subchondral collapse.       IMPRESSION:   1.  No acute findings.    2. Left femoral head avascular necrosis without evidence of subchondral collapse.  3. Remote L1 compression fracture.   4. Status post cholecystectomy.           Electronically signed by Hayden Jerome MD on 3/27/2017 5:41 PM

## 2017-06-09 ENCOUNTER — TELEPHONE (OUTPATIENT)
Dept: RHEUMATOLOGY | Facility: CLINIC | Age: 57
End: 2017-06-09

## 2017-06-12 ENCOUNTER — HOSPITAL ENCOUNTER (OUTPATIENT)
Dept: RADIOLOGY | Facility: HOSPITAL | Age: 57
Discharge: HOME | End: 2017-06-12
Attending: INTERNAL MEDICINE
Payer: MEDICARE

## 2017-06-12 DIAGNOSIS — M87.00 AVN (AVASCULAR NECROSIS OF BONE): ICD-10-CM

## 2017-06-12 DIAGNOSIS — M25.552 PAIN OF BOTH HIP JOINTS: ICD-10-CM

## 2017-06-12 DIAGNOSIS — M80.88XP: ICD-10-CM

## 2017-06-12 DIAGNOSIS — S32.010A COMPRESSION FRACTURE OF FIRST LUMBAR VERTEBRA, CLOSED, INITIAL ENCOUNTER: ICD-10-CM

## 2017-06-12 DIAGNOSIS — M06.9 RHEUMATOID ARTHRITIS, INVOLVING UNSPECIFIED SITE, UNSPECIFIED RHEUMATOID FACTOR PRESENCE: ICD-10-CM

## 2017-06-12 DIAGNOSIS — M25.551 PAIN OF BOTH HIP JOINTS: ICD-10-CM

## 2017-06-12 DIAGNOSIS — K52.9 GASTROENTERITIS: ICD-10-CM

## 2017-06-12 PROCEDURE — 73521 X-RAY EXAM HIPS BI 2 VIEWS: CPT | Mod: TC,PO

## 2017-06-12 PROCEDURE — 77080 DXA BONE DENSITY AXIAL: CPT | Mod: 26,,, | Performed by: RADIOLOGY

## 2017-06-12 PROCEDURE — 73521 X-RAY EXAM HIPS BI 2 VIEWS: CPT | Mod: 26,,, | Performed by: RADIOLOGY

## 2017-06-12 PROCEDURE — 77080 DXA BONE DENSITY AXIAL: CPT | Mod: TC,PO

## 2017-06-13 ENCOUNTER — TELEPHONE (OUTPATIENT)
Dept: RHEUMATOLOGY | Facility: CLINIC | Age: 57
End: 2017-06-13

## 2017-06-13 DIAGNOSIS — M80.88XG OSTEOPOROTIC COMPRESSION FRACTURE OF SPINE WITH DELAYED HEALING: ICD-10-CM

## 2017-06-13 NOTE — TELEPHONE ENCOUNTER
Spoke to patient regarding bone density results and xrays. Dr. Calvillo to order prolia therapy plan.  Dx Osteopenia and back fracture.   prolia in place

## 2017-06-16 PROBLEM — M80.88XG: Status: ACTIVE | Noted: 2017-06-16

## 2017-06-19 NOTE — TELEPHONE ENCOUNTER
Prolia plan in place. Please contact pt to schedule.     Thank you,    Amado Henry  [phone]  Infusion Line Only

## 2017-06-26 RX ORDER — PROMETHAZINE HYDROCHLORIDE 25 MG/1
TABLET ORAL
Qty: 60 TABLET | Refills: 2 | Status: SHIPPED | OUTPATIENT
Start: 2017-06-26 | End: 2017-09-13 | Stop reason: SDUPTHER

## 2017-06-27 ENCOUNTER — INFUSION (OUTPATIENT)
Dept: INFUSION THERAPY | Facility: HOSPITAL | Age: 57
End: 2017-06-27
Attending: INTERNAL MEDICINE
Payer: MEDICARE

## 2017-06-27 VITALS
BODY MASS INDEX: 28.55 KG/M2 | TEMPERATURE: 98 F | SYSTOLIC BLOOD PRESSURE: 99 MMHG | HEART RATE: 90 BPM | DIASTOLIC BLOOD PRESSURE: 63 MMHG | WEIGHT: 177.63 LBS | RESPIRATION RATE: 16 BRPM | HEIGHT: 66 IN

## 2017-06-27 DIAGNOSIS — M80.88XG OSTEOPOROTIC COMPRESSION FRACTURE OF SPINE WITH DELAYED HEALING: Primary | ICD-10-CM

## 2017-06-27 PROCEDURE — 96372 THER/PROPH/DIAG INJ SC/IM: CPT | Mod: PN

## 2017-06-27 PROCEDURE — 63600175 PHARM REV CODE 636 W HCPCS: Mod: PN | Performed by: INTERNAL MEDICINE

## 2017-06-27 RX ORDER — PROMETHAZINE HYDROCHLORIDE 25 MG/1
TABLET ORAL
Qty: 60 TABLET | Refills: 2 | OUTPATIENT
Start: 2017-06-27

## 2017-06-27 RX ORDER — MIRTAZAPINE 45 MG/1
45 TABLET, FILM COATED ORAL NIGHTLY
Qty: 30 TABLET | Refills: 11 | Status: SHIPPED | OUTPATIENT
Start: 2017-06-27 | End: 2019-04-12 | Stop reason: SDUPTHER

## 2017-06-27 RX ADMIN — DENOSUMAB 60 MG: 60 INJECTION SUBCUTANEOUS at 12:06

## 2017-08-07 DIAGNOSIS — G47.00 INSOMNIA: ICD-10-CM

## 2017-08-07 DIAGNOSIS — M06.9 RHEUMATOID ARTHRITIS OF HAND, UNSPECIFIED LATERALITY, UNSPECIFIED RHEUMATOID FACTOR PRESENCE: ICD-10-CM

## 2017-08-07 DIAGNOSIS — E87.6 LOW BLOOD POTASSIUM: ICD-10-CM

## 2017-08-07 DIAGNOSIS — Z86.39 H/O NON ANEMIC VITAMIN B12 DEFICIENCY: ICD-10-CM

## 2017-08-07 DIAGNOSIS — I10 ESSENTIAL HYPERTENSION: ICD-10-CM

## 2017-08-07 RX ORDER — PREDNISONE 5 MG/1
TABLET ORAL
Qty: 60 TABLET | Refills: 3 | Status: SHIPPED | OUTPATIENT
Start: 2017-08-07 | End: 2017-09-13 | Stop reason: SDUPTHER

## 2017-08-11 PROBLEM — G89.29 CHRONIC BACK PAIN: Status: ACTIVE | Noted: 2017-08-11

## 2017-08-11 PROBLEM — K21.9 GERD (GASTROESOPHAGEAL REFLUX DISEASE): Status: ACTIVE | Noted: 2017-08-11

## 2017-08-11 PROBLEM — N17.9 ACUTE RENAL FAILURE: Status: ACTIVE | Noted: 2017-08-11

## 2017-08-11 PROBLEM — I10 HYPERTENSION: Status: ACTIVE | Noted: 2017-08-11

## 2017-08-11 PROBLEM — R19.7 DIARRHEA: Status: ACTIVE | Noted: 2017-08-11

## 2017-08-11 PROBLEM — M54.9 CHRONIC BACK PAIN: Status: ACTIVE | Noted: 2017-08-11

## 2017-08-11 PROBLEM — M06.9 RHEUMATOID ARTHRITIS INVOLVING MULTIPLE SITES: Status: ACTIVE | Noted: 2017-08-11

## 2017-08-11 PROBLEM — E07.9 THYROID DISEASE: Status: ACTIVE | Noted: 2017-08-11

## 2017-08-11 RX ORDER — MIRTAZAPINE 45 MG/1
45 TABLET, FILM COATED ORAL NIGHTLY
OUTPATIENT
Start: 2017-08-11

## 2017-08-11 RX ORDER — SIMVASTATIN 40 MG/1
40 TABLET, FILM COATED ORAL NIGHTLY
OUTPATIENT
Start: 2017-08-11

## 2017-08-12 PROBLEM — E83.42 HYPOMAGNESEMIA: Status: ACTIVE | Noted: 2017-08-12

## 2017-08-12 PROBLEM — E83.51 HYPOCALCEMIA: Status: ACTIVE | Noted: 2017-08-12

## 2017-08-14 DIAGNOSIS — R19.5 YEAST IN STOOL: ICD-10-CM

## 2017-08-14 DIAGNOSIS — K52.9 GASTROENTERITIS: ICD-10-CM

## 2017-08-14 DIAGNOSIS — G47.00 INSOMNIA: ICD-10-CM

## 2017-08-14 DIAGNOSIS — K59.03 CONSTIPATION DUE TO OPIOID THERAPY: ICD-10-CM

## 2017-08-14 DIAGNOSIS — T40.2X5A CONSTIPATION DUE TO OPIOID THERAPY: ICD-10-CM

## 2017-08-14 DIAGNOSIS — E87.6 LOW BLOOD POTASSIUM: ICD-10-CM

## 2017-08-14 DIAGNOSIS — R14.0 ABDOMINAL DISTENSION (GASEOUS): ICD-10-CM

## 2017-08-14 DIAGNOSIS — M06.9 RHEUMATOID ARTHRITIS OF HAND, UNSPECIFIED LATERALITY, UNSPECIFIED RHEUMATOID FACTOR PRESENCE: ICD-10-CM

## 2017-08-14 DIAGNOSIS — I10 ESSENTIAL HYPERTENSION: ICD-10-CM

## 2017-08-14 DIAGNOSIS — Z86.39 H/O NON ANEMIC VITAMIN B12 DEFICIENCY: ICD-10-CM

## 2017-08-14 RX ORDER — POTASSIUM CHLORIDE 750 MG/1
TABLET, EXTENDED RELEASE ORAL
Qty: 30 TABLET | Refills: 3 | Status: SHIPPED | OUTPATIENT
Start: 2017-08-14 | End: 2017-12-11 | Stop reason: SDUPTHER

## 2017-08-14 RX ORDER — HYDROXYCHLOROQUINE SULFATE 200 MG/1
TABLET, FILM COATED ORAL
Qty: 60 TABLET | Refills: 3 | Status: SHIPPED | OUTPATIENT
Start: 2017-08-14 | End: 2017-12-11 | Stop reason: SDUPTHER

## 2017-08-14 RX ORDER — PREDNISONE 5 MG/1
TABLET ORAL
Qty: 60 TABLET | Refills: 3 | Status: SHIPPED | OUTPATIENT
Start: 2017-08-14 | End: 2017-12-12 | Stop reason: SDUPTHER

## 2017-09-13 ENCOUNTER — OFFICE VISIT (OUTPATIENT)
Dept: RHEUMATOLOGY | Facility: CLINIC | Age: 57
End: 2017-09-13
Payer: MEDICARE

## 2017-09-13 VITALS
DIASTOLIC BLOOD PRESSURE: 78 MMHG | HEART RATE: 86 BPM | SYSTOLIC BLOOD PRESSURE: 115 MMHG | BODY MASS INDEX: 28.22 KG/M2 | WEIGHT: 174.81 LBS

## 2017-09-13 DIAGNOSIS — F51.01 PRIMARY INSOMNIA: ICD-10-CM

## 2017-09-13 DIAGNOSIS — M80.88XG OSTEOPOROTIC COMPRESSION FRACTURE OF SPINE WITH DELAYED HEALING: Primary | ICD-10-CM

## 2017-09-13 DIAGNOSIS — G47.00 INSOMNIA: ICD-10-CM

## 2017-09-13 DIAGNOSIS — I10 ESSENTIAL HYPERTENSION: ICD-10-CM

## 2017-09-13 DIAGNOSIS — K04.7 TOOTH ABSCESS: ICD-10-CM

## 2017-09-13 DIAGNOSIS — M06.9 RHEUMATOID ARTHRITIS OF HAND, UNSPECIFIED LATERALITY, UNSPECIFIED RHEUMATOID FACTOR PRESENCE: ICD-10-CM

## 2017-09-13 DIAGNOSIS — Z86.39 H/O NON ANEMIC VITAMIN B12 DEFICIENCY: ICD-10-CM

## 2017-09-13 DIAGNOSIS — E87.6 LOW BLOOD POTASSIUM: ICD-10-CM

## 2017-09-13 DIAGNOSIS — M06.9 RHEUMATOID ARTHRITIS, INVOLVING UNSPECIFIED SITE, UNSPECIFIED RHEUMATOID FACTOR PRESENCE: ICD-10-CM

## 2017-09-13 PROCEDURE — 99213 OFFICE O/P EST LOW 20 MIN: CPT | Mod: PBBFAC,PO | Performed by: INTERNAL MEDICINE

## 2017-09-13 PROCEDURE — 99214 OFFICE O/P EST MOD 30 MIN: CPT | Mod: 25,S$PBB,, | Performed by: INTERNAL MEDICINE

## 2017-09-13 PROCEDURE — 3074F SYST BP LT 130 MM HG: CPT | Mod: ,,, | Performed by: INTERNAL MEDICINE

## 2017-09-13 PROCEDURE — 96372 THER/PROPH/DIAG INJ SC/IM: CPT | Mod: PBBFAC,PO

## 2017-09-13 PROCEDURE — 3078F DIAST BP <80 MM HG: CPT | Mod: ,,, | Performed by: INTERNAL MEDICINE

## 2017-09-13 PROCEDURE — 99999 PR PBB SHADOW E&M-EST. PATIENT-LVL III: CPT | Mod: PBBFAC,,, | Performed by: INTERNAL MEDICINE

## 2017-09-13 RX ORDER — CEPHALEXIN 500 MG/1
1000 CAPSULE ORAL EVERY 12 HOURS
Qty: 40 CAPSULE | Refills: 3 | Status: SHIPPED | OUTPATIENT
Start: 2017-09-13 | End: 2017-09-23

## 2017-09-13 RX ORDER — TEMAZEPAM 15 MG/1
30 CAPSULE ORAL NIGHTLY PRN
Qty: 60 CAPSULE | Refills: 3 | Status: SHIPPED | OUTPATIENT
Start: 2017-09-13 | End: 2018-01-10 | Stop reason: SDUPTHER

## 2017-09-13 RX ORDER — PROMETHAZINE HYDROCHLORIDE 25 MG/1
25 TABLET ORAL 2 TIMES DAILY PRN
Qty: 60 TABLET | Refills: 3 | Status: SHIPPED | OUTPATIENT
Start: 2017-09-13 | End: 2018-10-23

## 2017-09-13 RX ORDER — CEFTRIAXONE 1 G/1
1 INJECTION, POWDER, FOR SOLUTION INTRAMUSCULAR; INTRAVENOUS
Status: COMPLETED | OUTPATIENT
Start: 2017-09-13 | End: 2017-09-13

## 2017-09-13 RX ORDER — ERGOCALCIFEROL 1.25 MG/1
50000 CAPSULE ORAL
Qty: 4 CAPSULE | Refills: 6 | Status: SHIPPED | OUTPATIENT
Start: 2017-09-13 | End: 2018-06-26 | Stop reason: SDUPTHER

## 2017-09-13 RX ORDER — GABAPENTIN 300 MG/1
300 CAPSULE ORAL NIGHTLY
Qty: 30 CAPSULE | Refills: 11 | Status: SHIPPED | OUTPATIENT
Start: 2017-09-13 | End: 2017-10-24

## 2017-09-13 RX ORDER — CHLORHEXIDINE GLUCONATE ORAL RINSE 1.2 MG/ML
15 SOLUTION DENTAL 2 TIMES DAILY
Qty: 473 ML | Refills: 6 | Status: SHIPPED | OUTPATIENT
Start: 2017-09-13 | End: 2017-09-27

## 2017-09-13 RX ORDER — TEMAZEPAM 15 MG/1
15 CAPSULE ORAL NIGHTLY PRN
Qty: 30 CAPSULE | Refills: 3 | Status: CANCELLED | OUTPATIENT
Start: 2017-09-13 | End: 2017-10-13

## 2017-09-13 RX ADMIN — CEFTRIAXONE SODIUM 1 G: 1 INJECTION, POWDER, FOR SOLUTION INTRAMUSCULAR; INTRAVENOUS at 12:09

## 2017-09-13 ASSESSMENT — ROUTINE ASSESSMENT OF PATIENT INDEX DATA (RAPID3)
TOTAL RAPID3 SCORE: 5.44
WHEN YOU AWAKENED IN THE MORNING OVER THE LAST WEEK, PLEASE INDICATE THE AMOUNT OF TIME IT TAKES UNTIL YOU ARE AS LIMBER AS YOU WILL BE FOR THE DAY: ALL DAY
AM STIFFNESS SCORE: 1, YES
PSYCHOLOGICAL DISTRESS SCORE: 3.3
MDHAQ FUNCTION SCORE: 1.6
PAIN SCORE: 8
PATIENT GLOBAL ASSESSMENT SCORE: 3
FATIGUE SCORE: 5

## 2017-09-13 NOTE — PROGRESS NOTES
Administered 1g Cefrtriaxone (Rocephin)  to left gluteal. Pt tolerated well. No acute reaction noted to site. Pt instructed on S/S to report. Advised patient to wait in lobby 15 minutes after receiving injection to monitor for any reactions. Pt verbalized understanding.     Lot: [id number]  Exp: 03/2020

## 2017-09-13 NOTE — PATIENT INSTRUCTIONS
Take gabapentin at night to help back pain, if still not sleeping take restoril.  I sent vit d 50,000 iu weekly if insurance peres not pay get over the counter vit D about 2000 iu daily  Hold humira if you have an infection

## 2017-09-13 NOTE — PROGRESS NOTES
Subjective:       Patient ID: Anoop Womack is a 57 y.o. male.    Chief Complaint: Follow-up      Follow up: On humira and has an abscess on front tooth x 4 months pt has been packing with salt and it would drain kept coming, Pain is located in multiple joints, both shoulder(s), both elbow(s), both wrist(s), both MCP(s): 1st, 2nd, 3rd, 4th and 5th, both PIP(s): 1st, 2nd, 3rd, 4th and 5th, both DIP(s): 1st and 2nd, both hip(s), both knee(s) and both MTP(s): 1st, 2nd, 3rd, 4th and 5th, is described as aching, pulsating, shooting and throbbing, and is constant, moderate .  Associated symptoms include: crepitation, decreased range of motion, edema, effusion, tenderness and warmth.        Review of Systems   Constitutional: Positive for activity change. Negative for appetite change, chills, diaphoresis and unexpected weight change.   HENT: Negative for congestion, ear pain, facial swelling, mouth sores, nosebleeds, postnasal drip, rhinorrhea, sinus pressure, sneezing, sore throat, tinnitus and voice change.    Eyes: Negative for pain, discharge, redness, itching and visual disturbance.   Respiratory: Negative for apnea, cough, chest tightness, shortness of breath and wheezing.    Cardiovascular: Negative for chest pain, palpitations and leg swelling.   Gastrointestinal: Positive for abdominal distention, abdominal pain, constipation, diarrhea, nausea and vomiting.   Endocrine: Negative for cold intolerance, heat intolerance, polydipsia and polyuria.   Genitourinary: Negative for decreased urine volume, difficulty urinating, flank pain, frequency, hematuria and urgency.   Musculoskeletal: Positive for back pain, gait problem, neck pain and neck stiffness. Negative for arthralgias.   Skin: Positive for rash. Negative for pallor and wound.   Allergic/Immunologic: Negative for immunocompromised state.   Neurological: Negative for dizziness, tremors, seizures, syncope, weakness and numbness.   Hematological: Negative for  adenopathy. Does not bruise/bleed easily.   Psychiatric/Behavioral: Negative for sleep disturbance and suicidal ideas. The patient is not nervous/anxious.          Objective:     /78 (BP Location: Right arm, Patient Position: Sitting, BP Method: Large (Automatic))   Pulse 86   Wt 79.3 kg (174 lb 13.2 oz)   BMI 28.22 kg/m²      Physical Exam   Vitals reviewed.  Constitutional: He is oriented to person, place, and time. He appears distressed.   HENT:   Head: Normocephalic and atraumatic.   Mouth/Throat: Oropharynx is clear and moist.   Eyes: EOM are normal. Pupils are equal, round, and reactive to light.   Neck: Neck supple. No thyromegaly present.   Cardiovascular: Normal rate, regular rhythm and normal heart sounds.  Exam reveals no gallop and no friction rub.    No murmur heard.  Pulmonary/Chest: Breath sounds normal. He has no wheezes. He has no rales. He exhibits no tenderness.   Abdominal: There is no tenderness. There is no rebound and no guarding.       Right Side Rheumatological Exam     The patient is tender to palpation of the shoulder, elbow, wrist, knee, 1st PIP, 1st MCP, 2nd PIP, 2nd MCP, 3rd PIP, 3rd MCP, 4th PIP, 4th MCP and 5th PIP    He has swelling of the elbow, wrist, knee, 1st PIP, 1st MCP, 2nd PIP, 2nd MCP, 3rd PIP, 3rd MCP, 4th PIP, 4th MCP, 5th PIP and 5th MCP    The patient has an enlarged wrist and knee    Shoulder Exam   Tenderness Location: no tenderness    Range of Motion   Active Abduction: abnormal   Adduction: abnormal  Sensation: normal    Knee Exam   Tenderness Location: medial joint line and LCL  Patellofemoral Crepitus: positive  Effusion: positive  Sensation: normal    Hip Exam   Tenderness Location: posterior and anterior  Sensation: normal    Elbow/Wrist Exam   Tenderness Location: no tenderness  Sensation: normal    Left Side Rheumatological Exam     The patient is tender to palpation of the shoulder, elbow, wrist, knee, 1st PIP, 1st MCP, 2nd PIP, 2nd MCP, 3rd PIP, 3rd  MCP, 4th PIP, 4th MCP, 5th PIP and 5th MCP.    He has swelling of the wrist, knee, 1st PIP, 1st MCP, 2nd PIP, 2nd MCP, 3rd PIP, 3rd MCP, 4th PIP, 4th MCP, 5th PIP and 5th MCP    The patient has an enlarged knee.    Shoulder Exam   Tenderness Location: no tenderness    Range of Motion   Active Abduction: abnormal   Sensation: normal    Knee Exam   Tenderness Location: lateral joint line and medial joint line    Patellofemoral Crepitus: positive  Effusion: positive  Sensation: normal    Hip Exam   Tenderness Location: posterior and anterior  Sensation: normal    Elbow/Wrist Exam   Sensation: normal      Back/Neck Exam   General Inspection   Gait: normal       Tenderness Right paramedian tenderness of the Upper C-Spine, Lower C-Spine, Lower L-Spine and SI Joint.Left paramedian tenderness of the Upper C-Spine, Lower L-Spine, Lower C-Spine and SI Joint.    Neck Range of Motion   Flexion: Limited  Extension: Limited  Lymphadenopathy:     He has no cervical adenopathy.   Neurological: He is alert and oriented to person, place, and time. Gait normal.   Skin: No rash noted. No erythema. No pallor.     Psychiatric: Mood and affect normal.   Musculoskeletal: He exhibits tenderness and deformity. He exhibits no edema.   L hip pain and si joint pain          Status:  Final result   Visible to patient:  No (Not Released) Next appt:  12/26/2017 at 02:30 PM in Chem[facility]     Ref Range & Units 1mo ago  (8/13/17) 1mo ago  (8/12/17) 1mo ago  (8/11/17) 1yr ago  (11/30/15)    Sodium 136 - 145 mmol/L 139  137  138  142     Potassium 3.5 - 5.1 mmol/L 3.4   3.1   4.1  3.7     Chloride 95 - 110 mmol/L 107  100  99  103     CO2 22 - 31 mmol/L 24  26  21   29R     Glucose 70 - 110 mg/dL 70  81CM  109CM  124     Comments: The ADA recommends the following guidelines for fasting glucose:   Normal:       less than 100 mg/dL   Prediabetes:  100 mg/dL to 125 mg/dL   Diabetes:     126 mg/dL or higher     BUN, Bld 9 - 21  mg/dL 13  27   32   21R      Creatinine 0.50 - 1.40 mg/dL 0.92  1.36  1.72   0.9R     Calcium 8.4 - 10.2 mg/dL 7.6   6.7CM   8.5  8.9R     Anion Gap 8 - 16 mmol/L 8  11  18   10     eGFR if African American >60 mL/min/1.73 m^2 >60  >60  51   >60.0     eGFR if non African American >60 mL/min/1.73 m^2 >60  58CM   44CM   >60.0CM    Comments: Calculation used to obtain the estimated glomerular filtration              Results for orders placed or performed during the hospital encounter of 08/11/17   C Diff Toxin by PCR   Result Value Ref Range    C. diff PCR Negative Negative   Stool culture   Result Value Ref Range    Stool Culture       No Salmonella,Shigella,Vibrio,Campylobacter,Yersinia isolated.   No     Stool Culture Ecoli 0157:H7    E. coli 0157 antigen   Result Value Ref Range    Shiga Toxin 1 E.coli Negative     Shiga Toxin 2 E.coli Negative    CBC auto differential   Result Value Ref Range    WBC 10.22 3.90 - 12.70 K/uL    RBC 4.73 4.60 - 6.20 M/uL    Hemoglobin 15.4 14.0 - 18.0 g/dL    Hematocrit 42.0 40.0 - 54.0 %    MCV 89 82 - 98 fL    MCH 32.6 (H) 27.0 - 31.0 pg    MCHC 36.7 (H) 32.0 - 36.0 g/dL    RDW 12.5 11.5 - 14.5 %    Platelets 200 150 - 350 K/uL    MPV 10.8 9.2 - 12.9 fL    Gran # 6.5 1.8 - 7.7 K/uL    Lymph # 2.5 1.0 - 4.8 K/uL    Mono # 1.0 0.3 - 1.0 K/uL    Eos # 0.1 0.0 - 0.5 K/uL    Baso # 0.04 0.00 - 0.20 K/uL    nRBC 0 0 /100 WBC    Gran% 63.8 38.0 - 73.0 %    Lymph% 24.7 18.0 - 48.0 %    Mono% 9.8 4.0 - 15.0 %    Eosinophil% 1.3 0.0 - 8.0 %    Basophil% 0.4 0.0 - 1.9 %    Differential Method Automated    Comprehensive metabolic panel (CMP)   Result Value Ref Range    Sodium 138 136 - 145 mmol/L    Potassium 4.1 3.5 - 5.1 mmol/L    Chloride 99 95 - 110 mmol/L    CO2 21 (L) 22 - 31 mmol/L    Glucose 109 70 - 110 mg/dL    BUN, Bld 32 (H) 9 - 21 mg/dL    Creatinine 1.72 (H) 0.50 - 1.40 mg/dL    Calcium 8.5 8.4 - 10.2 mg/dL    Total Protein 8.9 (H) 6.0 - 8.4 g/dL    Albumin 5.2 3.5 - 5.2 g/dL     Total Bilirubin 1.7 (H) 0.2 - 1.3 mg/dL    Alkaline Phosphatase 111 38 - 145 U/L    AST 32 17 - 59 U/L    ALT 38 10 - 44 U/L    Anion Gap 18 (H) 8 - 16 mmol/L    eGFR if African American 51 (A) >60 mL/min/1.73 m^2    eGFR if non African American 44 (A) >60 mL/min/1.73 m^2   Urinalysis   Result Value Ref Range    Specimen UA Urine, Unspecified     Color, UA Yellow Yellow, Straw, Huma    Appearance, UA Clear Clear    pH, UA 6.0 5.0 - 8.0    Specific Gravity, UA 1.020 1.005 - 1.030    Protein, UA 1+ (A) Negative    Glucose, UA Negative Negative    Ketones, UA 2+ (A) Negative    Bilirubin (UA) Negative Negative    Occult Blood UA Trace (A) Negative    Nitrite, UA Negative Negative    Urobilinogen, UA Negative <2.0 EU/dL    Leukocytes, UA Negative Negative   Magnesium   Result Value Ref Range    Magnesium 1.7 1.6 - 2.6 mg/dL   Lipase   Result Value Ref Range    Lipase 209 23 - 300 U/L   CK   Result Value Ref Range     55 - 170 U/L   Urinalysis Microscopic   Result Value Ref Range    RBC, UA 2 0 - 4 /hpf    WBC, UA 2 0 - 5 /hpf    Bacteria, UA Rare None-Occ /hpf    Squam Epithel, UA 1 /hpf    Hyaline Casts, UA 7 (A) 0 - 1 /lpf    Microscopic Comment SEE COMMENT    Basic Metabolic Panel (BMP)   Result Value Ref Range    Sodium 137 136 - 145 mmol/L    Potassium 3.1 (L) 3.5 - 5.1 mmol/L    Chloride 100 95 - 110 mmol/L    CO2 26 22 - 31 mmol/L    Glucose 81 70 - 110 mg/dL    BUN, Bld 27 (H) 9 - 21 mg/dL    Creatinine 1.36 0.50 - 1.40 mg/dL    Calcium 6.7 (LL) 8.4 - 10.2 mg/dL    Anion Gap 11 8 - 16 mmol/L    eGFR if African American >60 >60 mL/min/1.73 m^2    eGFR if non African American 58 (A) >60 mL/min/1.73 m^2   Magnesium   Result Value Ref Range    Magnesium 1.6 1.6 - 2.6 mg/dL   CBC with Automated Differential   Result Value Ref Range    WBC 6.10 3.90 - 12.70 K/uL    RBC 3.80 (L) 4.60 - 6.20 M/uL    Hemoglobin 12.4 (L) 14.0 - 18.0 g/dL    Hematocrit 35.0 (L) 40.0 - 54.0 %    MCV 92 82 - 98 fL    MCH 32.6 (H)  27.0 - 31.0 pg    MCHC 35.4 32.0 - 36.0 g/dL    RDW 13.0 11.5 - 14.5 %    Platelets 145 (L) 150 - 350 K/uL    MPV 11.2 9.2 - 12.9 fL    Gran # 3.0 1.8 - 7.7 K/uL    Lymph # 2.2 1.0 - 4.8 K/uL    Mono # 0.8 0.3 - 1.0 K/uL    Eos # 0.1 0.0 - 0.5 K/uL    Baso # 0.03 0.00 - 0.20 K/uL    nRBC 0 0 /100 WBC    Gran% 49.0 38.0 - 73.0 %    Lymph% 35.9 18.0 - 48.0 %    Mono% 13.0 4.0 - 15.0 %    Eosinophil% 1.6 0.0 - 8.0 %    Basophil% 0.5 0.0 - 1.9 %    Differential Method Automated    Calcium, ionized   Result Value Ref Range    Calcium, Ion 0.91 (L) 1.06 - 1.42 mmol/L   WBC, Stool   Result Value Ref Range    Stool WBC No WBCs seen No neutrophils seen   Basic metabolic panel   Result Value Ref Range    Sodium 139 136 - 145 mmol/L    Potassium 3.4 (L) 3.5 - 5.1 mmol/L    Chloride 107 95 - 110 mmol/L    CO2 24 22 - 31 mmol/L    Glucose 70 70 - 110 mg/dL    BUN, Bld 13 9 - 21 mg/dL    Creatinine 0.92 0.50 - 1.40 mg/dL    Calcium 7.6 (L) 8.4 - 10.2 mg/dL    Anion Gap 8 8 - 16 mmol/L    eGFR if African American >60 >60 mL/min/1.73 m^2    eGFR if non African American >60 >60 mL/min/1.73 m^2   Magnesium   Result Value Ref Range    Magnesium 1.3 (L) 1.6 - 2.6 mg/dL             Assessment:         Encounter Diagnoses   Name Primary?    Osteoporotic compression fracture of spine with delayed healing Yes    Rheumatoid arthritis, involving unspecified site, unspecified rheumatoid factor presence     Tooth abscess     Primary insomnia          Plan:       Osteoporotic compression fracture of spine with delayed healing  -     promethazine (PHENERGAN) 25 MG tablet; Take 1 tablet (25 mg total) by mouth 2 (two) times daily as needed for Nausea.  Dispense: 60 tablet; Refill: 3  -     cefTRIAXone injection 1 g; Inject 1 g into the muscle one time.  -     cephALEXin (KEFLEX) 500 MG capsule; Take 2 capsules (1,000 mg total) by mouth every 12 (twelve) hours.  Dispense: 40 capsule; Refill: 3  -     chlorhexidine (PERIDEX) 0.12 % solution; Use  as directed 15 mLs in the mouth or throat 2 (two) times daily.  Dispense: 473 mL; Refill: 6  -     temazepam (RESTORIL) 15 mg Cap; Take 2 capsules (30 mg total) by mouth nightly as needed.  Dispense: 60 capsule; Refill: 3  -     Vitamin D; Future; Expected date: 09/13/2017  -     Comprehensive metabolic panel; Future; Expected date: 09/13/2017  -     CBC auto differential; Future; Expected date: 09/13/2017    Rheumatoid arthritis, involving unspecified site, unspecified rheumatoid factor presence  -     promethazine (PHENERGAN) 25 MG tablet; Take 1 tablet (25 mg total) by mouth 2 (two) times daily as needed for Nausea.  Dispense: 60 tablet; Refill: 3  -     cefTRIAXone injection 1 g; Inject 1 g into the muscle one time.  -     cephALEXin (KEFLEX) 500 MG capsule; Take 2 capsules (1,000 mg total) by mouth every 12 (twelve) hours.  Dispense: 40 capsule; Refill: 3  -     chlorhexidine (PERIDEX) 0.12 % solution; Use as directed 15 mLs in the mouth or throat 2 (two) times daily.  Dispense: 473 mL; Refill: 6  -     temazepam (RESTORIL) 15 mg Cap; Take 2 capsules (30 mg total) by mouth nightly as needed.  Dispense: 60 capsule; Refill: 3  -     Vitamin D; Future; Expected date: 09/13/2017  -     Comprehensive metabolic panel; Future; Expected date: 09/13/2017  -     CBC auto differential; Future; Expected date: 09/13/2017    Tooth abscess  -     promethazine (PHENERGAN) 25 MG tablet; Take 1 tablet (25 mg total) by mouth 2 (two) times daily as needed for Nausea.  Dispense: 60 tablet; Refill: 3  -     cefTRIAXone injection 1 g; Inject 1 g into the muscle one time.  -     cephALEXin (KEFLEX) 500 MG capsule; Take 2 capsules (1,000 mg total) by mouth every 12 (twelve) hours.  Dispense: 40 capsule; Refill: 3  -     chlorhexidine (PERIDEX) 0.12 % solution; Use as directed 15 mLs in the mouth or throat 2 (two) times daily.  Dispense: 473 mL; Refill: 6  -     temazepam (RESTORIL) 15 mg Cap; Take 2 capsules (30 mg total) by mouth  nightly as needed.  Dispense: 60 capsule; Refill: 3  -     Vitamin D; Future; Expected date: 09/13/2017  -     Comprehensive metabolic panel; Future; Expected date: 09/13/2017  -     CBC auto differential; Future; Expected date: 09/13/2017    Primary insomnia  -     promethazine (PHENERGAN) 25 MG tablet; Take 1 tablet (25 mg total) by mouth 2 (two) times daily as needed for Nausea.  Dispense: 60 tablet; Refill: 3  -     cefTRIAXone injection 1 g; Inject 1 g into the muscle one time.  -     cephALEXin (KEFLEX) 500 MG capsule; Take 2 capsules (1,000 mg total) by mouth every 12 (twelve) hours.  Dispense: 40 capsule; Refill: 3  -     chlorhexidine (PERIDEX) 0.12 % solution; Use as directed 15 mLs in the mouth or throat 2 (two) times daily.  Dispense: 473 mL; Refill: 6  -     temazepam (RESTORIL) 15 mg Cap; Take 2 capsules (30 mg total) by mouth nightly as needed.  Dispense: 60 capsule; Refill: 3  -     Vitamin D; Future; Expected date: 09/13/2017  -     Comprehensive metabolic panel; Future; Expected date: 09/13/2017  -     CBC auto differential; Future; Expected date: 09/13/2017    Other orders  -     Cancel: temazepam (RESTORIL) 15 mg Cap; Take 1 capsule (15 mg total) by mouth nightly as needed.  Dispense: 30 capsule; Refill: 3  -     gabapentin (NEURONTIN) 300 MG capsule; Take 1 capsule (300 mg total) by mouth every evening.  Dispense: 30 capsule; Refill: 11  -     ergocalciferol (ERGOCALCIFEROL) 50,000 unit Cap; Take 1 capsule (50,000 Units total) by mouth every 7 days.  Dispense: 4 capsule; Refill: 6

## 2017-09-14 NOTE — TELEPHONE ENCOUNTER
Called pharmacy and they stated all meds below were filled. Then called patient to let him know meds were ready for . He voiced all understanding.

## 2017-09-14 NOTE — TELEPHONE ENCOUNTER
----- Message from Lucia Hankins sent at 9/14/2017 11:01 AM CDT -----  Contact: self  Patient states pharmacy has not received request for new prescriptions of Cephalexin 500 mg; Chlorhexidine 12% solution; Ergocalciferol 50,000 units; Gabapentin 300 mg; Temazepam 15 mg. Please call patient at [phone]. Thanks!  [facility] [address] [address]  Phone: [phone] Fax: [phone]

## 2017-09-17 RX ORDER — ZOLPIDEM TARTRATE 10 MG/1
TABLET ORAL
Qty: 30 TABLET | Refills: 3 | Status: SHIPPED | OUTPATIENT
Start: 2017-09-17 | End: 2017-10-24

## 2017-09-21 RX ORDER — TAMSULOSIN HYDROCHLORIDE 0.4 MG/1
0.4 CAPSULE ORAL DAILY
Qty: 30 CAPSULE | Refills: 3 | Status: SHIPPED | OUTPATIENT
Start: 2017-09-21 | End: 2017-10-24

## 2017-10-15 RX ORDER — PROMETHAZINE HYDROCHLORIDE 25 MG/1
TABLET ORAL
Qty: 60 TABLET | Refills: 3 | Status: SHIPPED | OUTPATIENT
Start: 2017-10-15 | End: 2017-10-24

## 2017-12-07 ENCOUNTER — LAB VISIT (OUTPATIENT)
Dept: LAB | Facility: HOSPITAL | Age: 57
End: 2017-12-07
Attending: INTERNAL MEDICINE
Payer: MEDICARE

## 2017-12-07 DIAGNOSIS — M06.9 RHEUMATOID ARTHRITIS, INVOLVING UNSPECIFIED SITE, UNSPECIFIED RHEUMATOID FACTOR PRESENCE: ICD-10-CM

## 2017-12-07 DIAGNOSIS — K04.7 TOOTH ABSCESS: ICD-10-CM

## 2017-12-07 DIAGNOSIS — M80.88XG OSTEOPOROTIC COMPRESSION FRACTURE OF SPINE WITH DELAYED HEALING: ICD-10-CM

## 2017-12-07 DIAGNOSIS — F51.01 PRIMARY INSOMNIA: ICD-10-CM

## 2017-12-07 LAB
25(OH)D3+25(OH)D2 SERPL-MCNC: 23 NG/ML
ALBUMIN SERPL BCP-MCNC: 3.8 G/DL
ALP SERPL-CCNC: 63 U/L
ALT SERPL W/O P-5'-P-CCNC: 48 U/L
ANION GAP SERPL CALC-SCNC: 9 MMOL/L
AST SERPL-CCNC: 32 U/L
BASOPHILS # BLD AUTO: 0.03 K/UL
BASOPHILS NFR BLD: 0.4 %
BILIRUB SERPL-MCNC: 0.4 MG/DL
BUN SERPL-MCNC: 26 MG/DL
CALCIUM SERPL-MCNC: 9.4 MG/DL
CHLORIDE SERPL-SCNC: 101 MMOL/L
CO2 SERPL-SCNC: 29 MMOL/L
CREAT SERPL-MCNC: 0.9 MG/DL
DIFFERENTIAL METHOD: ABNORMAL
EOSINOPHIL # BLD AUTO: 0.2 K/UL
EOSINOPHIL NFR BLD: 2 %
ERYTHROCYTE [DISTWIDTH] IN BLOOD BY AUTOMATED COUNT: 14.1 %
EST. GFR  (AFRICAN AMERICAN): >60 ML/MIN/1.73 M^2
EST. GFR  (NON AFRICAN AMERICAN): >60 ML/MIN/1.73 M^2
GLUCOSE SERPL-MCNC: 117 MG/DL
HCT VFR BLD AUTO: 37.8 %
HGB BLD-MCNC: 12.4 G/DL
IMM GRANULOCYTES # BLD AUTO: 0.07 K/UL
IMM GRANULOCYTES NFR BLD AUTO: 0.8 %
LYMPHOCYTES # BLD AUTO: 2.8 K/UL
LYMPHOCYTES NFR BLD: 32.6 %
MCH RBC QN AUTO: 31.9 PG
MCHC RBC AUTO-ENTMCNC: 32.8 G/DL
MCV RBC AUTO: 97 FL
MONOCYTES # BLD AUTO: 0.9 K/UL
MONOCYTES NFR BLD: 11.1 %
NEUTROPHILS # BLD AUTO: 4.5 K/UL
NEUTROPHILS NFR BLD: 53.1 %
NRBC BLD-RTO: 0 /100 WBC
PLATELET # BLD AUTO: 168 K/UL
PMV BLD AUTO: 10.3 FL
POTASSIUM SERPL-SCNC: 3.6 MMOL/L
PROT SERPL-MCNC: 7.2 G/DL
RBC # BLD AUTO: 3.89 M/UL
SODIUM SERPL-SCNC: 139 MMOL/L
WBC # BLD AUTO: 8.46 K/UL

## 2017-12-07 PROCEDURE — 85025 COMPLETE CBC W/AUTO DIFF WBC: CPT

## 2017-12-07 PROCEDURE — 82306 VITAMIN D 25 HYDROXY: CPT

## 2017-12-07 PROCEDURE — 36415 COLL VENOUS BLD VENIPUNCTURE: CPT | Mod: PO

## 2017-12-07 PROCEDURE — 80053 COMPREHEN METABOLIC PANEL: CPT

## 2017-12-11 DIAGNOSIS — E87.6 LOW BLOOD POTASSIUM: ICD-10-CM

## 2017-12-11 DIAGNOSIS — Z86.39 H/O NON ANEMIC VITAMIN B12 DEFICIENCY: ICD-10-CM

## 2017-12-11 DIAGNOSIS — K52.9 GASTROENTERITIS: ICD-10-CM

## 2017-12-11 DIAGNOSIS — T40.2X5A CONSTIPATION DUE TO OPIOID THERAPY: ICD-10-CM

## 2017-12-11 DIAGNOSIS — K59.03 CONSTIPATION DUE TO OPIOID THERAPY: ICD-10-CM

## 2017-12-11 DIAGNOSIS — G47.00 INSOMNIA: ICD-10-CM

## 2017-12-11 DIAGNOSIS — I10 ESSENTIAL HYPERTENSION: ICD-10-CM

## 2017-12-11 DIAGNOSIS — M06.9 RHEUMATOID ARTHRITIS OF HAND, UNSPECIFIED LATERALITY, UNSPECIFIED RHEUMATOID FACTOR PRESENCE: ICD-10-CM

## 2017-12-11 DIAGNOSIS — R19.5 YEAST IN STOOL: ICD-10-CM

## 2017-12-11 DIAGNOSIS — R14.0 ABDOMINAL DISTENSION (GASEOUS): ICD-10-CM

## 2017-12-12 ENCOUNTER — OFFICE VISIT (OUTPATIENT)
Dept: RHEUMATOLOGY | Facility: CLINIC | Age: 57
End: 2017-12-12
Payer: MEDICARE

## 2017-12-12 DIAGNOSIS — G47.00 INSOMNIA, UNSPECIFIED TYPE: ICD-10-CM

## 2017-12-12 DIAGNOSIS — M80.88XG OSTEOPOROTIC COMPRESSION FRACTURE OF SPINE WITH DELAYED HEALING: Primary | ICD-10-CM

## 2017-12-12 DIAGNOSIS — Z86.39 H/O NON ANEMIC VITAMIN B12 DEFICIENCY: ICD-10-CM

## 2017-12-12 DIAGNOSIS — M06.9 RHEUMATOID ARTHRITIS OF HAND, UNSPECIFIED LATERALITY, UNSPECIFIED RHEUMATOID FACTOR PRESENCE: ICD-10-CM

## 2017-12-12 DIAGNOSIS — I10 ESSENTIAL HYPERTENSION: ICD-10-CM

## 2017-12-12 DIAGNOSIS — E87.6 LOW BLOOD POTASSIUM: ICD-10-CM

## 2017-12-12 PROCEDURE — 99999 PR PBB SHADOW E&M-EST. PATIENT-LVL II: CPT | Mod: PBBFAC,,, | Performed by: INTERNAL MEDICINE

## 2017-12-12 PROCEDURE — 99214 OFFICE O/P EST MOD 30 MIN: CPT | Mod: S$PBB,,, | Performed by: INTERNAL MEDICINE

## 2017-12-12 PROCEDURE — 99212 OFFICE O/P EST SF 10 MIN: CPT | Mod: PBBFAC,PO | Performed by: INTERNAL MEDICINE

## 2017-12-12 RX ORDER — CLOBETASOL PROPIONATE 0.5 MG/G
CREAM TOPICAL 2 TIMES DAILY PRN
Qty: 60 G | Refills: 2 | Status: SHIPPED | OUTPATIENT
Start: 2017-12-12

## 2017-12-12 RX ORDER — PREDNISONE 5 MG/1
5 TABLET ORAL DAILY
Qty: 30 TABLET | Refills: 6 | Status: SHIPPED | OUTPATIENT
Start: 2017-12-12 | End: 2018-12-12 | Stop reason: SDUPTHER

## 2017-12-12 RX ORDER — FLUOCINONIDE 0.5 MG/G
1 CREAM TOPICAL 2 TIMES DAILY PRN
Qty: 60 G | Refills: 5 | Status: SHIPPED | OUTPATIENT
Start: 2017-12-12

## 2017-12-12 NOTE — PROGRESS NOTES
Subjective:       Patient ID: Anoop Womack is a 57 y.o. male.    Chief Complaint: Disease Management and Pain      Follow up: psa and osteoporosis and on prolia, held Humira. Pain is located in multiple joints, both shoulder(s), both elbow(s), both wrist(s), both MCP(s): 1st, 2nd, 3rd, 4th and 5th, both PIP(s): 1st, 2nd, 3rd, 4th and 5th, both DIP(s): 1st and 2nd, both hip(s), both knee(s) and both MTP(s): 1st, 2nd, 3rd, 4th and 5th, is described as aching, pulsating, shooting and throbbing, and is constant, moderate .  Associated symptoms include: crepitation, decreased range of motion, edema, effusion, tenderness and warmth.        Review of Systems   Constitutional: Positive for activity change and fatigue. Negative for appetite change, chills, diaphoresis and unexpected weight change.   HENT: Negative for congestion, ear pain, facial swelling, mouth sores, nosebleeds, postnasal drip, rhinorrhea, sinus pressure, sneezing, sore throat, tinnitus and voice change.    Eyes: Negative for pain, discharge, redness, itching and visual disturbance.   Respiratory: Negative for apnea, cough, chest tightness, shortness of breath and wheezing.    Cardiovascular: Negative for chest pain, palpitations and leg swelling.   Gastrointestinal: Positive for abdominal distention, abdominal pain, constipation, diarrhea, nausea and vomiting.   Endocrine: Negative for cold intolerance, heat intolerance, polydipsia and polyuria.   Genitourinary: Negative for decreased urine volume, difficulty urinating, flank pain, frequency, hematuria and urgency.   Musculoskeletal: Positive for back pain, gait problem, neck pain and neck stiffness. Negative for arthralgias.   Skin: Positive for rash. Negative for pallor and wound.   Allergic/Immunologic: Negative for immunocompromised state.   Neurological: Negative for dizziness, tremors, seizures, syncope, weakness and numbness.   Hematological: Negative for adenopathy. Does not bruise/bleed  easily.   Psychiatric/Behavioral: Negative for sleep disturbance and suicidal ideas. The patient is not nervous/anxious.          Objective:     There were no vitals taken for this visit.     Physical Exam   Vitals reviewed.  Constitutional: He is oriented to person, place, and time. No distress.   HENT:   Head: Normocephalic and atraumatic.   Mouth/Throat: Oropharynx is clear and moist.   Eyes: EOM are normal. Pupils are equal, round, and reactive to light.   Neck: Neck supple. No thyromegaly present.   Cardiovascular: Normal rate, regular rhythm and normal heart sounds.  Exam reveals no gallop and no friction rub.    No murmur heard.  Pulmonary/Chest: Breath sounds normal. He has no wheezes. He has no rales. He exhibits no tenderness.   Abdominal: There is no tenderness. There is no rebound and no guarding.       Right Side Rheumatological Exam     The patient is tender to palpation of the shoulder, elbow, wrist, knee, 1st PIP, 1st MCP, 2nd PIP, 2nd MCP, 3rd PIP, 3rd MCP, 4th PIP, 4th MCP and 5th PIP    He has swelling of the elbow, wrist, knee, 1st PIP, 1st MCP, 2nd PIP, 2nd MCP, 3rd PIP, 3rd MCP, 4th PIP, 4th MCP, 5th PIP and 5th MCP    The patient has an enlarged wrist and knee    Shoulder Exam   Tenderness Location: no tenderness    Range of Motion   Active Abduction: abnormal   Adduction: abnormal  Sensation: normal    Knee Exam   Tenderness Location: medial joint line and LCL  Patellofemoral Crepitus: positive  Effusion: positive  Sensation: normal    Hip Exam   Tenderness Location: posterior and anterior  Sensation: normal    Elbow/Wrist Exam   Tenderness Location: no tenderness  Sensation: normal    Left Side Rheumatological Exam     The patient is tender to palpation of the shoulder, elbow, wrist, knee, 1st PIP, 1st MCP, 2nd PIP, 2nd MCP, 3rd PIP, 3rd MCP, 4th PIP, 4th MCP, 5th PIP and 5th MCP.    He has swelling of the wrist, knee, 1st PIP, 1st MCP, 2nd PIP, 2nd MCP, 3rd PIP, 3rd MCP, 4th PIP, 4th MCP,  5th PIP and 5th MCP    The patient has an enlarged knee.    Shoulder Exam   Tenderness Location: no tenderness    Range of Motion   Active Abduction: abnormal   Sensation: normal    Knee Exam   Tenderness Location: lateral joint line and medial joint line    Patellofemoral Crepitus: positive  Effusion: positive  Sensation: normal    Hip Exam   Tenderness Location: posterior and anterior  Sensation: normal    Elbow/Wrist Exam   Sensation: normal      Back/Neck Exam   General Inspection   Gait: normal       Tenderness Right paramedian tenderness of the Upper C-Spine, Lower C-Spine, Lower L-Spine and SI Joint.Left paramedian tenderness of the Upper C-Spine, Lower L-Spine, Lower C-Spine and SI Joint.    Neck Range of Motion   Flexion: Limited  Extension: Limited  Lymphadenopathy:     He has no cervical adenopathy.   Neurological: He is alert and oriented to person, place, and time. Gait normal.   Skin: No rash noted. No erythema. No pallor.     Psychiatric: Mood and affect normal.   Musculoskeletal: He exhibits tenderness and deformity. He exhibits no edema.   L hip pain and si joint pain            Results for orders placed or performed in visit on 12/07/17   Vitamin D   Result Value Ref Range    Vit D, 25-Hydroxy 23 (L) 30 - 96 ng/mL   Comprehensive metabolic panel   Result Value Ref Range    Sodium 139 136 - 145 mmol/L    Potassium 3.6 3.5 - 5.1 mmol/L    Chloride 101 95 - 110 mmol/L    CO2 29 23 - 29 mmol/L    Glucose 117 (H) 70 - 110 mg/dL    BUN, Bld 26 (H) 6 - 20 mg/dL    Creatinine 0.9 0.5 - 1.4 mg/dL    Calcium 9.4 8.7 - 10.5 mg/dL    Total Protein 7.2 6.0 - 8.4 g/dL    Albumin 3.8 3.5 - 5.2 g/dL    Total Bilirubin 0.4 0.1 - 1.0 mg/dL    Alkaline Phosphatase 63 55 - 135 U/L    AST 32 10 - 40 U/L    ALT 48 (H) 10 - 44 U/L    Anion Gap 9 8 - 16 mmol/L    eGFR if African American >60.0 >60 mL/min/1.73 m^2    eGFR if non African American >60.0 >60 mL/min/1.73 m^2   CBC auto differential   Result Value Ref Range     WBC 8.46 3.90 - 12.70 K/uL    RBC 3.89 (L) 4.60 - 6.20 M/uL    Hemoglobin 12.4 (L) 14.0 - 18.0 g/dL    Hematocrit 37.8 (L) 40.0 - 54.0 %    MCV 97 82 - 98 fL    MCH 31.9 (H) 27.0 - 31.0 pg    MCHC 32.8 32.0 - 36.0 g/dL    RDW 14.1 11.5 - 14.5 %    Platelets 168 150 - 350 K/uL    MPV 10.3 9.2 - 12.9 fL    Immature Granulocytes 0.8 (H) 0.0 - 0.5 %    Gran # 4.5 1.8 - 7.7 K/uL    Immature Grans (Abs) 0.07 (H) 0.00 - 0.04 K/uL    Lymph # 2.8 1.0 - 4.8 K/uL    Mono # 0.9 0.3 - 1.0 K/uL    Eos # 0.2 0.0 - 0.5 K/uL    Baso # 0.03 0.00 - 0.20 K/uL    nRBC 0 0 /100 WBC    Gran% 53.1 38.0 - 73.0 %    Lymph% 32.6 18.0 - 48.0 %    Mono% 11.1 4.0 - 15.0 %    Eosinophil% 2.0 0.0 - 8.0 %    Basophil% 0.4 0.0 - 1.9 %    Differential Method Automated        Assessment:         Encounter Diagnoses   Name Primary?    Osteoporotic compression fracture of spine with delayed healing Yes    Essential hypertension     Low blood potassium     Rheumatoid arthritis of hand, unspecified laterality, unspecified rheumatoid factor presence     H/O non anemic vitamin B12 deficiency     Insomnia, unspecified type          Plan:       Osteoporotic compression fracture of spine with delayed healing  -     clobetasol (TEMOVATE) 0.05 % cream; Apply topically 2 (two) times daily as needed (for skin rash).  Dispense: 60 g; Refill: 2  -     fluocinonide 0.05% (LIDEX) 0.05 % cream; Apply 1 application topically 2 (two) times daily as needed.  Dispense: 60 g; Refill: 5  -     predniSONE (DELTASONE) 5 MG tablet; Take 1 tablet (5 mg total) by mouth once daily.  Dispense: 30 tablet; Refill: 6  -     CBC auto differential; Future; Expected date: 12/12/2017  -     Comprehensive metabolic panel; Future; Expected date: 12/12/2017  -     Sedimentation rate, manual; Future; Expected date: 12/12/2017  -     B. burgdorferi Abs (Lyme Disease); Future; Expected date: 12/12/2017  -     LYME DISEASE WESTERN BLOT; Future; Expected date: 12/12/2017  -      TESTOSTERONE; Future; Expected date: 12/12/2017  -     Rheumatoid factor; Future; Expected date: 12/12/2017  -     Cyclic citrul peptide antibody, IgG; Future; Expected date: 12/12/2017    Essential hypertension  -     clobetasol (TEMOVATE) 0.05 % cream; Apply topically 2 (two) times daily as needed (for skin rash).  Dispense: 60 g; Refill: 2  -     fluocinonide 0.05% (LIDEX) 0.05 % cream; Apply 1 application topically 2 (two) times daily as needed.  Dispense: 60 g; Refill: 5  -     predniSONE (DELTASONE) 5 MG tablet; Take 1 tablet (5 mg total) by mouth once daily.  Dispense: 30 tablet; Refill: 6  -     CBC auto differential; Future; Expected date: 12/12/2017  -     Comprehensive metabolic panel; Future; Expected date: 12/12/2017  -     Sedimentation rate, manual; Future; Expected date: 12/12/2017  -     B. burgdorferi Abs (Lyme Disease); Future; Expected date: 12/12/2017  -     LYME DISEASE WESTERN BLOT; Future; Expected date: 12/12/2017  -     TESTOSTERONE; Future; Expected date: 12/12/2017  -     Rheumatoid factor; Future; Expected date: 12/12/2017  -     Cyclic citrul peptide antibody, IgG; Future; Expected date: 12/12/2017    Low blood potassium  -     clobetasol (TEMOVATE) 0.05 % cream; Apply topically 2 (two) times daily as needed (for skin rash).  Dispense: 60 g; Refill: 2  -     fluocinonide 0.05% (LIDEX) 0.05 % cream; Apply 1 application topically 2 (two) times daily as needed.  Dispense: 60 g; Refill: 5  -     predniSONE (DELTASONE) 5 MG tablet; Take 1 tablet (5 mg total) by mouth once daily.  Dispense: 30 tablet; Refill: 6  -     CBC auto differential; Future; Expected date: 12/12/2017  -     Comprehensive metabolic panel; Future; Expected date: 12/12/2017  -     Sedimentation rate, manual; Future; Expected date: 12/12/2017  -     B. burgdorferi Abs (Lyme Disease); Future; Expected date: 12/12/2017  -     LYME DISEASE WESTERN BLOT; Future; Expected date: 12/12/2017  -     TESTOSTERONE; Future; Expected  date: 12/12/2017  -     Rheumatoid factor; Future; Expected date: 12/12/2017  -     Cyclic citrul peptide antibody, IgG; Future; Expected date: 12/12/2017    Rheumatoid arthritis of hand, unspecified laterality, unspecified rheumatoid factor presence  -     CBC auto differential; Future; Expected date: 12/12/2017  -     Comprehensive metabolic panel; Future; Expected date: 12/12/2017  -     Sedimentation rate, manual; Future; Expected date: 12/12/2017  -     B. burgdorferi Abs (Lyme Disease); Future; Expected date: 12/12/2017  -     LYME DISEASE WESTERN BLOT; Future; Expected date: 12/12/2017  -     TESTOSTERONE; Future; Expected date: 12/12/2017  -     Rheumatoid factor; Future; Expected date: 12/12/2017  -     Cyclic citrul peptide antibody, IgG; Future; Expected date: 12/12/2017    H/O non anemic vitamin B12 deficiency  -     clobetasol (TEMOVATE) 0.05 % cream; Apply topically 2 (two) times daily as needed (for skin rash).  Dispense: 60 g; Refill: 2  -     fluocinonide 0.05% (LIDEX) 0.05 % cream; Apply 1 application topically 2 (two) times daily as needed.  Dispense: 60 g; Refill: 5  -     predniSONE (DELTASONE) 5 MG tablet; Take 1 tablet (5 mg total) by mouth once daily.  Dispense: 30 tablet; Refill: 6  -     CBC auto differential; Future; Expected date: 12/12/2017  -     Comprehensive metabolic panel; Future; Expected date: 12/12/2017  -     Sedimentation rate, manual; Future; Expected date: 12/12/2017  -     B. burgdorferi Abs (Lyme Disease); Future; Expected date: 12/12/2017  -     LYME DISEASE WESTERN BLOT; Future; Expected date: 12/12/2017  -     TESTOSTERONE; Future; Expected date: 12/12/2017  -     Rheumatoid factor; Future; Expected date: 12/12/2017  -     Cyclic citrul peptide antibody, IgG; Future; Expected date: 12/12/2017    Insomnia, unspecified type  -     predniSONE (DELTASONE) 5 MG tablet; Take 1 tablet (5 mg total) by mouth once daily.  Dispense: 30 tablet; Refill: 6

## 2017-12-19 RX ORDER — HYDROXYCHLOROQUINE SULFATE 200 MG/1
TABLET, FILM COATED ORAL
Qty: 60 TABLET | Refills: 3 | Status: SHIPPED | OUTPATIENT
Start: 2017-12-19 | End: 2018-04-18 | Stop reason: SDUPTHER

## 2017-12-19 RX ORDER — POTASSIUM CHLORIDE 750 MG/1
TABLET, EXTENDED RELEASE ORAL
Qty: 30 TABLET | Refills: 3 | Status: SHIPPED | OUTPATIENT
Start: 2017-12-19 | End: 2018-05-19 | Stop reason: SDUPTHER

## 2017-12-28 ENCOUNTER — INFUSION (OUTPATIENT)
Dept: INFUSION THERAPY | Facility: HOSPITAL | Age: 57
End: 2017-12-28
Attending: INTERNAL MEDICINE
Payer: MEDICARE

## 2017-12-28 VITALS
BODY MASS INDEX: 27.69 KG/M2 | HEART RATE: 107 BPM | RESPIRATION RATE: 18 BRPM | OXYGEN SATURATION: 98 % | TEMPERATURE: 98 F | SYSTOLIC BLOOD PRESSURE: 102 MMHG | WEIGHT: 172.31 LBS | DIASTOLIC BLOOD PRESSURE: 75 MMHG | HEIGHT: 66 IN

## 2017-12-28 DIAGNOSIS — M80.88XG OSTEOPOROTIC COMPRESSION FRACTURE OF SPINE WITH DELAYED HEALING: Primary | ICD-10-CM

## 2017-12-28 PROCEDURE — 96372 THER/PROPH/DIAG INJ SC/IM: CPT | Mod: PN

## 2017-12-28 PROCEDURE — 63600175 PHARM REV CODE 636 W HCPCS: Mod: PN | Performed by: INTERNAL MEDICINE

## 2017-12-28 RX ADMIN — DENOSUMAB 60 MG: 60 INJECTION SUBCUTANEOUS at 12:12

## 2017-12-28 NOTE — PATIENT INSTRUCTIONS
Fall Prevention  Falls often occur due to slipping, tripping or losing your balance. Millions of people fall every year and injure themselves. Here are ways to reduce your risk of falling again.  · Think about your fall, was there anything that caused your fall that can be fixed, removed, or replaced?  · Make your home safe by keeping walkways clear of objects you may trip over.  · Use non-slip pads under rugs. Do not use area rugs or small throw rugs.  · Use non-slip mats in bathtubs and showers.  · Install handrails and lights on staircases.  · Do not walk in poorly lit areas.  · Do not stand on chairs or wobbly ladders.  · Use caution when reaching overhead or looking upward. This position can cause a loss of balance.  · Be sure your shoes fit properly, have non-slip bottoms and are in good condition.   · Wear shoes both inside and out. Avoid going barefoot or wearing slippers.  · Be cautious when going up and down stairs, curbs, and when walking on uneven sidewalks.  · If your balance is poor, consider using a cane or walker.  · If your fall was related to alcohol use, stop or limit alcohol intake.   · If your fall was related to use of sleeping medicines, talk to your doctor about this. You may need to reduce your dosage at bedtime if you awaken during the night to go to the bathroom.    · To reduce the need for nighttime bathroom trips:  ¨ Avoid drinking fluids for several hours before going to bed  ¨ Empty your bladder before going to bed  ¨ Men can keep a urinal at the bedside  · Stay as active as you can. Balance, flexibility, strength, and endurance all come from exercise. They all play a role in preventing falls. Ask your healthcare provider which types of activity are right for you.  · Get your vision checked on a regular basis.  · If you have pets, know where they are before you stand up or walk so you don't trip over them.  · Use night lights.  Date Last Reviewed: 11/5/2015  © 3606-1180 [employer]. [address] [place] [address], [place] [address]. All rights reserved. This information is not intended as a substitute for professional medical care. Always follow your healthcare professional's instructions.        Preventing Falls: Are You At Risk of Falling?     Ask for help to reduce risk of falling in your home.     As you get older, you're not as steady on your feet as you once were. And you may have health problems you didn't have when you were younger. So, it's not surprising that older people are more likely to trip and fall. Falling can be very serious. It can change your overall health and quality of life. That's why it's important to be aware of your own risk of falling.  The dangers of falling  Falls are one of the main causes of injury in people over age 65. An older person who falls may take longer to get better than a younger person. And, after a fall, an older person is more likely to have problems that don't go away. So, preventing falls can help you avoid serious health problems.  Are you at risk of falling?  Answer these questions to rate your level of risk.  · Are you a woman?  · Have you fallen or stumbled in the last year?  · Are you over age 65?  · Are you ever dizzy or lightheaded with standing?  · Do you have a hard time getting in and out of the bathtub or on and off the toilet?  · Do you lean on objects to help you get around? Or do you use a cane or walker?  · Do you have vision or hearing problems? For example, do you need new glasses or hearing aids?  · Do you have 2 or more long-lasting (chronic) medical conditions?  · Do you take 3 or more medicines?  · Have you felt depressed recently?  · Have you had more trouble with your memory in recent months?  · Are there hazards in your home that might cause you to fall, such as loose rugs or poor lighting?  · Do you have a pet that jumps on you or might trip you?  · Have you stopped getting regular exercise?  · Do you have  "diabetes?   · Do you have a neurologic disease, such as Parkinson or Alzheimer disease?   · Do you drink alcohol?  · Do you wear athletic shoes or slippers, or go barefoot at home?  You can help prevent falls  If you answered "yes" to any of the above questions, you should take steps to reduce your risk of a fall. Monitoring health conditions and keeping walkways in your home free of clutter are just 2 ways. Changing is sometimes easier said than done. But keep in mind that even small changes can make you less likely to fall.  The fear of falling  It's normal to be scared of falling, especially if you've fallen before. But being afraid can actually make you more likely to fall. This is because:  · Fear might cause you to become less active. Being less active can lead to a loss of strength and balance.  · Fear can lead to isolation from others, depression, or the use of more medicines or alcohol. And all these things make falling even more likely.  To break the cycle, learn more about ways to avoid falling. As you take control, you may find yourself feeling less afraid.   Date Last Reviewed: 6/12/2015  © 2867-8015 [employer]. [address] [place] [address], [place] [address]. All rights reserved. This information is not intended as a substitute for professional medical care. Always follow your healthcare professional's instructions.        Exercises to Prevent Falls  Certain types of exercises may help make you less likely to fall. Try the ones below. Or do other exercises that your health care provider suggests. Depending on your health, you may need to start slowly. Don't let that stop you. Even small amounts of exercise can help you. Be sure to talk to your health care provider before starting any exercise program.       Improve balance  Many types of exercise can help improve balance. Elie chi and yoga are good examples. Here's another one to try. You can do it anytime and almost anywhere.  · Stand next to " "a counter or solid support.  · Push yourself up onto your tiptoes.  · Hold for 5 seconds. If you start to lose your balance, hold on to the counter.  · Rest and repeat 5 times. Work up to holding for 20 to 30 seconds, if you can. Increase flexibility  Being more flexible makes it easier for you to move around safely. Try exercises like the seated hamstring stretch.  · Sit in a chair and put one foot on a stool.  · Straighten your leg and reach with both hands down either side of your leg. Reach as far down your leg as you can.  · Hold for about 20 seconds.  · Go back to the starting position. Then repeat 5 times. Switch legs. Build strength  "Resistance" exercises help build strength. You can do them without equipment. Or you can use weights, elastic bands, or special machines. One such exercise is called the biceps curl. You can hold a 1-pound weight or even a can of soup. Do this exercise at least 3 times a week. Strive for every day.  · Sit up straight in a chair.  · Keep your elbow close to your body and your wrist straight.  · Bend your arm, moving your hand up to your shoulder. Then slowly lower your arm.  · Repeat 5 times. Switch to the other arm.   Build your staying power  Aerobic exercises make your heart and lungs stronger so you can keep moving longer. Walking and swimming are two of the best types of exercises you can do. Using a stationary bike is great, too. Find an aerobic exercise that you enjoy. Start slowly and build up. Even 5 minutes is helpful. Aim for a goal of 30 minutes, at least 3 times a week. You don't have to do 30 minutes in 1 session. Break it up and walk a little throughout the day.  More helpful tips  · Start easy. Slowly work up to doing more.  · Talk with your health care provider about the best exercises for you.  · Call senior centers or health clubs about exercise programs.  · If needed, have a family member watch you walk every so often to check your stability.  · Exercise with " a friend. Choose an activity you both enjoy.  · Consider danny chi or yoga to strengthen your balance.  · Try exercises that you can do anytime, anywhere. Here are 2 examples. Have someone with you when you first try these:  ¨ Practice walking by placing 1 foot right in front of the other.  ¨ Stand up and sit down 10 times. Repeat this throughout the day.   Date Last Reviewed: 6/13/2015  © 2859-3210 [employer]. [address] [place] [address], [place] [address]. All rights reserved. This information is not intended as a substitute for professional medical care. Always follow your healthcare professional's instructions.

## 2017-12-28 NOTE — PLAN OF CARE
Problem: Patient Care Overview  Goal: Plan of Care Review  Outcome: Ongoing (interventions implemented as appropriate)  Tolerated prolia injection without any c/o; RTC as directed; Verb agreement with plan; amb off unit independently by self

## 2018-01-10 DIAGNOSIS — M80.88XG OSTEOPOROTIC COMPRESSION FRACTURE OF SPINE WITH DELAYED HEALING: ICD-10-CM

## 2018-01-10 DIAGNOSIS — E87.6 LOW BLOOD POTASSIUM: ICD-10-CM

## 2018-01-10 DIAGNOSIS — M06.9 RHEUMATOID ARTHRITIS OF HAND, UNSPECIFIED LATERALITY, UNSPECIFIED RHEUMATOID FACTOR PRESENCE: ICD-10-CM

## 2018-01-10 DIAGNOSIS — K04.7 TOOTH ABSCESS: ICD-10-CM

## 2018-01-10 DIAGNOSIS — F51.01 PRIMARY INSOMNIA: ICD-10-CM

## 2018-01-10 DIAGNOSIS — M06.9 RHEUMATOID ARTHRITIS, INVOLVING UNSPECIFIED SITE, UNSPECIFIED RHEUMATOID FACTOR PRESENCE: ICD-10-CM

## 2018-01-10 DIAGNOSIS — Z86.39 H/O NON ANEMIC VITAMIN B12 DEFICIENCY: ICD-10-CM

## 2018-01-10 DIAGNOSIS — G47.00 INSOMNIA: ICD-10-CM

## 2018-01-10 DIAGNOSIS — I10 ESSENTIAL HYPERTENSION: ICD-10-CM

## 2018-01-14 RX ORDER — ZOLPIDEM TARTRATE 10 MG/1
TABLET ORAL
Qty: 30 TABLET | Refills: 3 | Status: SHIPPED | OUTPATIENT
Start: 2018-01-14 | End: 2018-05-22

## 2018-01-14 RX ORDER — LUBIPROSTONE 24 UG/1
CAPSULE, GELATIN COATED ORAL
Qty: 30 CAPSULE | Refills: 3 | Status: SHIPPED | OUTPATIENT
Start: 2018-01-14 | End: 2018-05-19 | Stop reason: SDUPTHER

## 2018-01-14 RX ORDER — TEMAZEPAM 15 MG/1
CAPSULE ORAL
Qty: 60 CAPSULE | Refills: 3 | Status: SHIPPED | OUTPATIENT
Start: 2018-01-14 | End: 2018-07-25 | Stop reason: SDUPTHER

## 2018-02-07 DIAGNOSIS — K52.9 GASTROENTERITIS: ICD-10-CM

## 2018-02-07 DIAGNOSIS — T40.2X5A CONSTIPATION DUE TO OPIOID THERAPY: ICD-10-CM

## 2018-02-07 DIAGNOSIS — R19.5 YEAST IN STOOL: ICD-10-CM

## 2018-02-07 DIAGNOSIS — M06.9 RHEUMATOID ARTHRITIS OF HAND, UNSPECIFIED LATERALITY, UNSPECIFIED RHEUMATOID FACTOR PRESENCE: ICD-10-CM

## 2018-02-07 DIAGNOSIS — K59.03 CONSTIPATION DUE TO OPIOID THERAPY: ICD-10-CM

## 2018-02-07 DIAGNOSIS — R14.0 ABDOMINAL DISTENSION (GASEOUS): ICD-10-CM

## 2018-02-15 RX ORDER — PROMETHAZINE HYDROCHLORIDE 25 MG/1
TABLET ORAL
Qty: 60 TABLET | Refills: 5 | Status: SHIPPED | OUTPATIENT
Start: 2018-02-15 | End: 2018-03-19 | Stop reason: SDUPTHER

## 2018-02-15 RX ORDER — TIZANIDINE 4 MG/1
TABLET ORAL
Qty: 60 TABLET | Refills: 3 | Status: SHIPPED | OUTPATIENT
Start: 2018-02-15 | End: 2018-05-29 | Stop reason: SDUPTHER

## 2018-03-19 DIAGNOSIS — K59.03 CONSTIPATION DUE TO OPIOID THERAPY: ICD-10-CM

## 2018-03-19 DIAGNOSIS — R19.5 YEAST IN STOOL: ICD-10-CM

## 2018-03-19 DIAGNOSIS — M06.9 RHEUMATOID ARTHRITIS OF HAND, UNSPECIFIED LATERALITY, UNSPECIFIED RHEUMATOID FACTOR PRESENCE: ICD-10-CM

## 2018-03-19 DIAGNOSIS — K52.9 GASTROENTERITIS: ICD-10-CM

## 2018-03-19 DIAGNOSIS — R14.0 ABDOMINAL DISTENSION (GASEOUS): ICD-10-CM

## 2018-03-19 DIAGNOSIS — T40.2X5A CONSTIPATION DUE TO OPIOID THERAPY: ICD-10-CM

## 2018-03-19 NOTE — TELEPHONE ENCOUNTER
----- Message from Destini Boland sent at 3/19/2018  3:38 PM CDT -----  Patient stated [facility] needs doctor to call for prior authorization for medication: promethazine (PHENERGAN) 25 MG tablet at number [phone].

## 2018-03-19 NOTE — TELEPHONE ENCOUNTER
----- Message from Ayesha Rodriguez sent at 3/19/2018 11:27 AM CDT -----  Patient is calling concerning his promethazine (PHENERGAN) 25 MG tablet. He states that he has been getting this for years but now the insurance is not wanting to pay for it. Please call back to advise at [phone].      [address] [address]  Phone: [phone] Fax: [phone]

## 2018-03-22 RX ORDER — ERGOCALCIFEROL 1.25 MG/1
CAPSULE ORAL
Qty: 4 CAPSULE | Refills: 3 | OUTPATIENT
Start: 2018-03-22

## 2018-03-22 RX ORDER — ERGOCALCIFEROL 1.25 MG/1
50000 CAPSULE ORAL
Qty: 4 CAPSULE | Refills: 6 | Status: SHIPPED | OUTPATIENT
Start: 2018-03-22 | End: 2018-06-11

## 2018-03-22 RX ORDER — PROMETHAZINE HYDROCHLORIDE 25 MG/1
TABLET ORAL
Qty: 60 TABLET | Refills: 5 | Status: SHIPPED | OUTPATIENT
Start: 2018-03-22 | End: 2018-12-12

## 2018-03-22 RX ORDER — CYANOCOBALAMIN 1000 UG/ML
1000 INJECTION, SOLUTION INTRAMUSCULAR; SUBCUTANEOUS
Qty: 10 ML | Refills: 3 | Status: SHIPPED | OUTPATIENT
Start: 2018-03-22 | End: 2018-09-17

## 2018-04-18 DIAGNOSIS — G47.00 INSOMNIA: ICD-10-CM

## 2018-04-18 DIAGNOSIS — E87.6 LOW BLOOD POTASSIUM: ICD-10-CM

## 2018-04-18 DIAGNOSIS — Z86.39 H/O NON ANEMIC VITAMIN B12 DEFICIENCY: ICD-10-CM

## 2018-04-18 DIAGNOSIS — M06.9 RHEUMATOID ARTHRITIS OF HAND, UNSPECIFIED LATERALITY, UNSPECIFIED RHEUMATOID FACTOR PRESENCE: ICD-10-CM

## 2018-04-18 DIAGNOSIS — I10 ESSENTIAL HYPERTENSION: ICD-10-CM

## 2018-04-18 RX ORDER — HYDROXYCHLOROQUINE SULFATE 200 MG/1
TABLET, FILM COATED ORAL
Qty: 60 TABLET | Refills: 3 | Status: SHIPPED | OUTPATIENT
Start: 2018-04-18 | End: 2018-08-21 | Stop reason: SDUPTHER

## 2018-05-07 DIAGNOSIS — K58.1 IRRITABLE BOWEL SYNDROME WITH CONSTIPATION: Primary | ICD-10-CM

## 2018-05-07 RX ORDER — LUBIPROSTONE 24 UG/1
CAPSULE, GELATIN COATED ORAL
Qty: 30 CAPSULE | OUTPATIENT
Start: 2018-05-07

## 2018-05-07 RX ORDER — LUBIPROSTONE 24 UG/1
24 CAPSULE ORAL 2 TIMES DAILY
Qty: 60 CAPSULE | Refills: 6 | Status: SHIPPED | OUTPATIENT
Start: 2018-05-07 | End: 2018-09-17 | Stop reason: SDUPTHER

## 2018-05-11 RX ORDER — LUBIPROSTONE 24 UG/1
CAPSULE, GELATIN COATED ORAL
Qty: 30 CAPSULE | OUTPATIENT
Start: 2018-05-11

## 2018-05-18 DIAGNOSIS — R79.89 LOW SERUM TESTOSTERONE LEVEL: Primary | ICD-10-CM

## 2018-05-19 DIAGNOSIS — T40.2X5A CONSTIPATION DUE TO OPIOID THERAPY: ICD-10-CM

## 2018-05-19 DIAGNOSIS — R19.5 YEAST IN STOOL: ICD-10-CM

## 2018-05-19 DIAGNOSIS — K52.9 GASTROENTERITIS: ICD-10-CM

## 2018-05-19 DIAGNOSIS — K59.03 CONSTIPATION DUE TO OPIOID THERAPY: ICD-10-CM

## 2018-05-19 DIAGNOSIS — M06.9 RHEUMATOID ARTHRITIS OF HAND, UNSPECIFIED LATERALITY, UNSPECIFIED RHEUMATOID FACTOR PRESENCE: ICD-10-CM

## 2018-05-19 DIAGNOSIS — R14.0 ABDOMINAL DISTENSION (GASEOUS): ICD-10-CM

## 2018-05-19 RX ORDER — LUBIPROSTONE 24 UG/1
CAPSULE, GELATIN COATED ORAL
Qty: 30 CAPSULE | Refills: 3 | Status: SHIPPED | OUTPATIENT
Start: 2018-05-19 | End: 2018-11-07 | Stop reason: SDUPTHER

## 2018-05-19 RX ORDER — POTASSIUM CHLORIDE 750 MG/1
TABLET, EXTENDED RELEASE ORAL
Qty: 30 TABLET | Refills: 3 | Status: SHIPPED | OUTPATIENT
Start: 2018-05-19 | End: 2018-09-19 | Stop reason: SDUPTHER

## 2018-05-22 ENCOUNTER — OFFICE VISIT (OUTPATIENT)
Dept: RHEUMATOLOGY | Facility: CLINIC | Age: 58
End: 2018-05-22
Payer: MEDICARE

## 2018-05-22 VITALS
DIASTOLIC BLOOD PRESSURE: 80 MMHG | HEIGHT: 66 IN | SYSTOLIC BLOOD PRESSURE: 122 MMHG | WEIGHT: 186.31 LBS | HEART RATE: 70 BPM | BODY MASS INDEX: 29.94 KG/M2

## 2018-05-22 DIAGNOSIS — M06.9 RHEUMATOID ARTHRITIS OF HAND, UNSPECIFIED LATERALITY, UNSPECIFIED RHEUMATOID FACTOR PRESENCE: ICD-10-CM

## 2018-05-22 DIAGNOSIS — M80.88XG OSTEOPOROTIC COMPRESSION FRACTURE OF SPINE WITH DELAYED HEALING: ICD-10-CM

## 2018-05-22 DIAGNOSIS — R79.89 LOW SERUM TESTOSTERONE LEVEL: Primary | ICD-10-CM

## 2018-05-22 PROCEDURE — 96372 THER/PROPH/DIAG INJ SC/IM: CPT | Mod: PBBFAC,PO

## 2018-05-22 PROCEDURE — 99214 OFFICE O/P EST MOD 30 MIN: CPT | Mod: 25,S$PBB,, | Performed by: INTERNAL MEDICINE

## 2018-05-22 PROCEDURE — 99215 OFFICE O/P EST HI 40 MIN: CPT | Mod: PBBFAC,PO,25 | Performed by: INTERNAL MEDICINE

## 2018-05-22 PROCEDURE — 99999 PR PBB SHADOW E&M-EST. PATIENT-LVL V: CPT | Mod: PBBFAC,,, | Performed by: INTERNAL MEDICINE

## 2018-05-22 RX ORDER — METHYLPREDNISOLONE ACETATE 80 MG/ML
80 INJECTION, SUSPENSION INTRA-ARTICULAR; INTRALESIONAL; INTRAMUSCULAR; SOFT TISSUE
Status: COMPLETED | OUTPATIENT
Start: 2018-05-22 | End: 2018-05-22

## 2018-05-22 RX ORDER — KETOROLAC TROMETHAMINE 30 MG/ML
60 INJECTION, SOLUTION INTRAMUSCULAR; INTRAVENOUS
Status: COMPLETED | OUTPATIENT
Start: 2018-05-22 | End: 2018-05-22

## 2018-05-22 RX ADMIN — KETOROLAC TROMETHAMINE 60 MG: 60 INJECTION, SOLUTION INTRAMUSCULAR at 04:05

## 2018-05-22 RX ADMIN — METHYLPREDNISOLONE ACETATE 80 MG: 80 INJECTION, SUSPENSION INTRA-ARTICULAR; INTRALESIONAL; INTRAMUSCULAR; SOFT TISSUE at 04:05

## 2018-05-22 NOTE — PROGRESS NOTES
Subjective:       Patient ID: Anoop Womack is a 57 y.o. male.    Chief Complaint: Follow-up      Follow up: psa and osteoporosis and on prolia, and  Humira. Pain is located in multiple joints, both shoulder(s), both elbow(s), both wrist(s), both MCP(s): 1st, 2nd, 3rd, 4th and 5th, both PIP(s): 1st, 2nd, 3rd, 4th and 5th, both DIP(s): 1st and 2nd, both hip(s), both knee(s) and both MTP(s): 1st, 2nd, 3rd, 4th and 5th, is described as aching, pulsating, shooting and throbbing, and is constant, moderate .  Associated symptoms include: crepitation, decreased range of motion, edema, effusion, tenderness and warmth.   edema      Review of Systems   Constitutional: Positive for activity change and fatigue. Negative for appetite change, chills, diaphoresis and unexpected weight change.   HENT: Negative for congestion, ear pain, facial swelling, mouth sores, nosebleeds, postnasal drip, rhinorrhea, sinus pressure, sneezing, sore throat, tinnitus and voice change.    Eyes: Negative for pain, discharge, redness, itching and visual disturbance.   Respiratory: Negative for apnea, cough, chest tightness, shortness of breath and wheezing.    Cardiovascular: Negative for chest pain, palpitations and leg swelling.   Gastrointestinal: Positive for abdominal distention, abdominal pain, constipation, diarrhea, nausea and vomiting.   Endocrine: Negative for cold intolerance, heat intolerance, polydipsia and polyuria.   Genitourinary: Negative for decreased urine volume, difficulty urinating, flank pain, frequency, hematuria and urgency.   Musculoskeletal: Positive for back pain, gait problem, neck pain and neck stiffness. Negative for arthralgias.   Skin: Positive for rash. Negative for pallor and wound.   Allergic/Immunologic: Negative for immunocompromised state.   Neurological: Negative for dizziness, tremors, seizures, syncope, weakness and numbness.   Hematological: Negative for adenopathy. Does not bruise/bleed easily.  "  Psychiatric/Behavioral: Negative for sleep disturbance and suicidal ideas. The patient is not nervous/anxious.          Objective:     /80   Pulse 70   Ht 5' 6" (1.676 m)   Wt 84.5 kg (186 lb 4.6 oz)   BMI 30.07 kg/m²      Physical Exam   Vitals reviewed.  Constitutional: He is oriented to person, place, and time. No distress.   HENT:   Head: Normocephalic and atraumatic.   Mouth/Throat: Oropharynx is clear and moist.   Eyes: EOM are normal. Pupils are equal, round, and reactive to light.   Neck: Neck supple. No thyromegaly present.   Cardiovascular: Normal rate, regular rhythm and normal heart sounds.  Exam reveals no gallop and no friction rub.    No murmur heard.  Pulmonary/Chest: Breath sounds normal. He has no wheezes. He has no rales. He exhibits no tenderness.   Abdominal: There is no tenderness. There is no rebound and no guarding.       Right Side Rheumatological Exam     The patient is tender to palpation of the shoulder, elbow, wrist, knee, 1st PIP, 1st MCP, 2nd PIP, 2nd MCP, 3rd PIP, 3rd MCP, 4th PIP, 4th MCP and 5th PIP    He has swelling of the elbow, wrist, knee, 1st PIP, 1st MCP, 2nd PIP, 2nd MCP, 3rd PIP, 3rd MCP, 4th PIP, 4th MCP, 5th PIP and 5th MCP    The patient has an enlarged wrist and knee    Shoulder Exam   Tenderness Location: no tenderness    Range of Motion   Active Abduction: abnormal   Adduction: abnormal  Sensation: normal    Knee Exam   Tenderness Location: medial joint line and LCL  Patellofemoral Crepitus: positive  Effusion: positive  Sensation: normal    Hip Exam   Tenderness Location: posterior and anterior  Sensation: normal    Elbow/Wrist Exam   Tenderness Location: no tenderness  Sensation: normal    Left Side Rheumatological Exam     The patient is tender to palpation of the shoulder, elbow, wrist, knee, 1st PIP, 1st MCP, 2nd PIP, 2nd MCP, 3rd PIP, 3rd MCP, 4th PIP, 4th MCP, 5th PIP and 5th MCP.    He has swelling of the wrist, knee, 1st PIP, 1st MCP, 2nd PIP, 2nd " MCP, 3rd PIP, 3rd MCP, 4th PIP, 4th MCP, 5th PIP and 5th MCP    The patient has an enlarged knee.    Shoulder Exam   Tenderness Location: no tenderness    Range of Motion   Active Abduction: abnormal   Sensation: normal    Knee Exam   Tenderness Location: lateral joint line and medial joint line    Patellofemoral Crepitus: positive  Effusion: positive  Sensation: normal    Hip Exam   Tenderness Location: posterior and anterior  Sensation: normal    Elbow/Wrist Exam   Sensation: normal      Back/Neck Exam   General Inspection   Gait: normal       Tenderness Right paramedian tenderness of the Upper C-Spine, Lower C-Spine, Lower L-Spine and SI Joint.Left paramedian tenderness of the Upper C-Spine, Lower L-Spine, Lower C-Spine and SI Joint.    Neck Range of Motion   Flexion: Limited  Extension: Limited  Lymphadenopathy:     He has no cervical adenopathy.   Neurological: He is alert and oriented to person, place, and time. Gait normal.   Skin: No rash noted. No erythema. No pallor.     Psychiatric: Mood and affect normal.   Musculoskeletal: He exhibits tenderness and deformity. He exhibits no edema.   L hip pain and si joint pain            Results for orders placed or performed in visit on 12/07/17   Vitamin D   Result Value Ref Range    Vit D, 25-Hydroxy 23 (L) 30 - 96 ng/mL   Comprehensive metabolic panel   Result Value Ref Range    Sodium 139 136 - 145 mmol/L    Potassium 3.6 3.5 - 5.1 mmol/L    Chloride 101 95 - 110 mmol/L    CO2 29 23 - 29 mmol/L    Glucose 117 (H) 70 - 110 mg/dL    BUN, Bld 26 (H) 6 - 20 mg/dL    Creatinine 0.9 0.5 - 1.4 mg/dL    Calcium 9.4 8.7 - 10.5 mg/dL    Total Protein 7.2 6.0 - 8.4 g/dL    Albumin 3.8 3.5 - 5.2 g/dL    Total Bilirubin 0.4 0.1 - 1.0 mg/dL    Alkaline Phosphatase 63 55 - 135 U/L    AST 32 10 - 40 U/L    ALT 48 (H) 10 - 44 U/L    Anion Gap 9 8 - 16 mmol/L    eGFR if African American >60.0 >60 mL/min/1.73 m^2    eGFR if non African American >60.0 >60 mL/min/1.73 m^2   CBC auto  differential   Result Value Ref Range    WBC 8.46 3.90 - 12.70 K/uL    RBC 3.89 (L) 4.60 - 6.20 M/uL    Hemoglobin 12.4 (L) 14.0 - 18.0 g/dL    Hematocrit 37.8 (L) 40.0 - 54.0 %    MCV 97 82 - 98 fL    MCH 31.9 (H) 27.0 - 31.0 pg    MCHC 32.8 32.0 - 36.0 g/dL    RDW 14.1 11.5 - 14.5 %    Platelets 168 150 - 350 K/uL    MPV 10.3 9.2 - 12.9 fL    Immature Granulocytes 0.8 (H) 0.0 - 0.5 %    Gran # (ANC) 4.5 1.8 - 7.7 K/uL    Immature Grans (Abs) 0.07 (H) 0.00 - 0.04 K/uL    Lymph # 2.8 1.0 - 4.8 K/uL    Mono # 0.9 0.3 - 1.0 K/uL    Eos # 0.2 0.0 - 0.5 K/uL    Baso # 0.03 0.00 - 0.20 K/uL    nRBC 0 0 /100 WBC    Gran% 53.1 38.0 - 73.0 %    Lymph% 32.6 18.0 - 48.0 %    Mono% 11.1 4.0 - 15.0 %    Eosinophil% 2.0 0.0 - 8.0 %    Basophil% 0.4 0.0 - 1.9 %    Differential Method Automated      pt had external labs done, we reviewed  the results at this visit and the lab results will be scanned into the  [facility] , 5/15/18 RA 30 ccp 230, cbc, cmp wnl, testosterone 246  Assessment:         Encounter Diagnoses   Name Primary?    Low serum testosterone level Yes    Rheumatoid arthritis of hand, unspecified laterality, unspecified rheumatoid factor presence     Osteoporotic compression fracture of spine with delayed healing          Plan:       Low serum testosterone level  -     Comprehensive metabolic panel; Future; Expected date: 05/22/2018  -     ketorolac injection 60 mg; Inject 2 mLs (60 mg total) into the muscle one time.  -     methylPREDNISolone acetate injection 80 mg; Inject 1 mL (80 mg total) into the muscle one time.  -     Ambulatory consult to Urology    Rheumatoid arthritis of hand, unspecified laterality, unspecified rheumatoid factor presence  -     Comprehensive metabolic panel; Future; Expected date: 05/22/2018  -     ketorolac injection 60 mg; Inject 2 mLs (60 mg total) into the muscle one time.  -     methylPREDNISolone acetate injection 80 mg; Inject 1 mL (80 mg total) into the muscle one time.  -      Ambulatory consult to Urology    Osteoporotic compression fracture of spine with delayed healing  -     Comprehensive metabolic panel; Future; Expected date: 05/22/2018  -     ketorolac injection 60 mg; Inject 2 mLs (60 mg total) into the muscle one time.  -     methylPREDNISolone acetate injection 80 mg; Inject 1 mL (80 mg total) into the muscle one time.  -     Ambulatory consult to Urology    Other orders  -     LAB REPORT    continue prolia, and humira and refer to urology,

## 2018-05-22 NOTE — PROGRESS NOTES
Administered 1 cc ( 80 mg/ml ) of depomedrol to the right upper outer gluteal. Informed of s/s to report verbalized understanding. No adverse reactions noted.    Lot # [id number]  Expiration 05/19    Administered 2 cc ( 30 mg/ml ) of toradol to the left upper outer gluteal. Informed of s/s to report verbalized understanding. No adverse reactions noted.    Lot # [id number]  Expiration 1 nov 2019

## 2018-05-29 DIAGNOSIS — K52.9 GASTROENTERITIS: ICD-10-CM

## 2018-05-29 DIAGNOSIS — R19.5 YEAST IN STOOL: ICD-10-CM

## 2018-05-29 DIAGNOSIS — K59.03 CONSTIPATION DUE TO OPIOID THERAPY: ICD-10-CM

## 2018-05-29 DIAGNOSIS — R14.0 ABDOMINAL DISTENSION (GASEOUS): ICD-10-CM

## 2018-05-29 DIAGNOSIS — M06.9 RHEUMATOID ARTHRITIS OF HAND, UNSPECIFIED LATERALITY, UNSPECIFIED RHEUMATOID FACTOR PRESENCE: ICD-10-CM

## 2018-05-29 DIAGNOSIS — T40.2X5A CONSTIPATION DUE TO OPIOID THERAPY: ICD-10-CM

## 2018-05-29 RX ORDER — TIZANIDINE 4 MG/1
TABLET ORAL
Qty: 60 TABLET | Refills: 3 | Status: SHIPPED | OUTPATIENT
Start: 2018-05-29 | End: 2018-10-09 | Stop reason: SDUPTHER

## 2018-06-11 ENCOUNTER — INITIAL CONSULT (OUTPATIENT)
Dept: UROLOGY | Facility: CLINIC | Age: 58
End: 2018-06-11
Payer: MEDICARE

## 2018-06-11 VITALS
DIASTOLIC BLOOD PRESSURE: 70 MMHG | HEART RATE: 77 BPM | WEIGHT: 186.31 LBS | BODY MASS INDEX: 29.94 KG/M2 | SYSTOLIC BLOOD PRESSURE: 140 MMHG | HEIGHT: 66 IN

## 2018-06-11 DIAGNOSIS — N40.1 ENLARGED PROSTATE WITH URINARY OBSTRUCTION: Primary | ICD-10-CM

## 2018-06-11 DIAGNOSIS — R79.89 LOW TESTOSTERONE LEVEL IN MALE: ICD-10-CM

## 2018-06-11 DIAGNOSIS — N13.8 ENLARGED PROSTATE WITH URINARY OBSTRUCTION: Primary | ICD-10-CM

## 2018-06-11 DIAGNOSIS — R35.1 NOCTURIA MORE THAN TWICE PER NIGHT: ICD-10-CM

## 2018-06-11 DIAGNOSIS — Z12.5 SCREENING FOR PROSTATE CANCER: ICD-10-CM

## 2018-06-11 DIAGNOSIS — N52.9 IMPOTENCE: ICD-10-CM

## 2018-06-11 LAB
BILIRUB SERPL-MCNC: NORMAL MG/DL
BLOOD URINE, POC: NORMAL
COLOR, POC UA: YELLOW
GLUCOSE UR QL STRIP: NORMAL
KETONES UR QL STRIP: NORMAL
LEUKOCYTE ESTERASE URINE, POC: NORMAL
NITRITE, POC UA: NORMAL
PH, POC UA: 5.5
PROTEIN, POC: NORMAL
SPECIFIC GRAVITY, POC UA: 1.02
UROBILINOGEN, POC UA: NORMAL

## 2018-06-11 PROCEDURE — 81002 URINALYSIS NONAUTO W/O SCOPE: CPT | Mod: PBBFAC,PO | Performed by: UROLOGY

## 2018-06-11 PROCEDURE — 99204 OFFICE O/P NEW MOD 45 MIN: CPT | Mod: S$PBB,,, | Performed by: UROLOGY

## 2018-06-11 PROCEDURE — 99213 OFFICE O/P EST LOW 20 MIN: CPT | Mod: PBBFAC,PO,25 | Performed by: UROLOGY

## 2018-06-11 PROCEDURE — 99999 PR PBB SHADOW E&M-EST. PATIENT-LVL III: CPT | Mod: PBBFAC,,, | Performed by: UROLOGY

## 2018-06-11 PROCEDURE — 96372 THER/PROPH/DIAG INJ SC/IM: CPT | Mod: PBBFAC,PO

## 2018-06-11 RX ORDER — GABAPENTIN 600 MG/1
600 TABLET ORAL 3 TIMES DAILY
COMMUNITY
Start: 2018-06-05

## 2018-06-11 RX ORDER — CHLORHEXIDINE GLUCONATE ORAL RINSE 1.2 MG/ML
15 SOLUTION DENTAL 2 TIMES DAILY
COMMUNITY
Start: 2018-05-30

## 2018-06-11 RX ORDER — TESTOSTERONE CYPIONATE 200 MG/ML
200 INJECTION, SOLUTION INTRAMUSCULAR ONCE
Status: COMPLETED | OUTPATIENT
Start: 2018-06-11 | End: 2018-06-11

## 2018-06-11 RX ORDER — TESTOSTERONE CYPIONATE 200 MG/ML
200 INJECTION, SOLUTION INTRAMUSCULAR
Qty: 10 ML | Refills: 2 | Status: SHIPPED | OUTPATIENT
Start: 2018-06-11 | End: 2019-01-08 | Stop reason: SDUPTHER

## 2018-06-11 RX ADMIN — TESTOSTERONE CYPIONATE 200 MG: 200 INJECTION, SOLUTION INTRAMUSCULAR at 04:06

## 2018-06-11 NOTE — PROGRESS NOTES
Pt received 200 mg testosterone injection IM in LUQ of buttocks. Pt tolerated procedure well. Pt stayed for observation 20 min after injection with no adverse reactions.

## 2018-06-11 NOTE — PROGRESS NOTES
Subjective:       Patient ID: Anoop Womack is a 57 y.o. male.    Chief Complaint: Low Testosterone    HPI     57 year old with low testosterone.  Baseline 246.  He has been on IM testosterone replacement in the distant past and he felt better while on testosterone replacement.  He has no voiding complaints although urine has a bad odor.  He denies dysuria and gross hematuria.  He does note nocturia x4 which he says has been a long term problem.  He did have hematuria on one occasion after a heart cath procedure.  He has no family history of prostate cancer.   No recent PSA.  He also complains of ED.  Takes Viagra on occasion.  JUAN 6/10  Urine dipstick shows negative for all components.    Past Medical History:   Diagnosis Date    Arthritis     Asthma     Chronic back pain     Compression fracture     from trauma    COPD (chronic obstructive pulmonary disease)     Diverticulitis     Gastroparesis     GERD (gastroesophageal reflux disease)     Hypertension     Thyroid disease      Past Surgical History:   Procedure Laterality Date    CHOLECYSTECTOMY      CORONARY ANGIOPLASTY      angiogram    HERNIA REPAIR      knee scope      WRIST FRACTURE SURGERY Bilateral steel plate in both       Current Outpatient Prescriptions:     acidophilus-pectin, citrus (PROBIOTIC ACIDOPHILUS-PECTIN) 100 million cell-10 mg Cap, Take 1 tablet by mouth once daily., Disp: 30 capsule, Rfl: 3    adalimumab (HUMIRA PEN) PnKt injection, Inject 0.8 mLs (40 mg total) into the skin every 14 (fourteen) days., Disp: 1.6 mL, Rfl: 11    AMITIZA 24 mcg Cap, TAKE ONE CAPSULE EVERY MORNING WITH BREAKFAST FOR IRRITABLE BOWEL SYNDROME / HARD STOOLS *THANK YOU*, Disp: 30 capsule, Rfl: 3    BREO ELLIPTA 100-25 mcg/dose diskus inhaler, Inhale 1 puff into the lungs once daily. , Disp: , Rfl:     calcium carbonate 1250 MG capsule, Take 1,250 mg by mouth 2 (two) times daily with meals., Disp: , Rfl:     chlorhexidine (PERIDEX) 0.12 %  solution, , Disp: , Rfl:     clobetasol (TEMOVATE) 0.05 % cream, Apply topically 2 (two) times daily as needed (for skin rash)., Disp: 60 g, Rfl: 2    DEXILANT 60 mg capsule, Take 60 mg by mouth once daily. , Disp: , Rfl:     DILT- mg CDCR, Take 120 mg by mouth once daily. , Disp: , Rfl:     ergocalciferol (ERGOCALCIFEROL) 50,000 unit Cap, Take 1 capsule (50,000 Units total) by mouth every 7 days. (Patient taking differently: Take 50,000 Units by mouth every Thursday. ), Disp: 4 capsule, Rfl: 6    fluocinonide 0.05% (LIDEX) 0.05 % cream, Apply 1 application topically 2 (two) times daily as needed., Disp: 60 g, Rfl: 5    gabapentin (NEURONTIN) 600 MG tablet, 600 mg 3 (three) times daily. , Disp: , Rfl:     hydroxychloroquine (PLAQUENIL) 200 mg tablet, TAKE 1 TABLET TWICE DAILY *THANK YOU*, Disp: 60 tablet, Rfl: 3    ketoconazole (NIZORAL) 2 % shampoo, Apply 1 application topically daily as needed. , Disp: , Rfl:     lubiprostone (AMITIZA) 24 MCG Cap, Take 1 capsule (24 mcg total) by mouth 2 (two) times daily., Disp: 60 capsule, Rfl: 6    magnesium oxide (MAG-OX) 400 mg tablet, Take 1 tablet (400 mg total) by mouth 2 (two) times daily., Disp: , Rfl: 0    metoclopramide HCl (REGLAN) 10 MG tablet, Take 1 tablet (10 mg total) by mouth every 6 (six) hours as needed (nausea and vomiting. take before meals)., Disp: 30 tablet, Rfl: 3    mirtazapine (REMERON) 45 MG tablet, Take 1 tablet (45 mg total) by mouth every evening., Disp: 30 tablet, Rfl: 11    morphine (MS CONTIN) 30 MG 12 hr tablet, Take 30 mg by mouth 3 (three) times daily. , Disp: , Rfl:     ondansetron (ZOFRAN-ODT) 8 MG TbDL, Take 1 tablet (8 mg total) by mouth every 8 (eight) hours as needed., Disp: 15 tablet, Rfl: 0    oxycodone-acetaminophen (PERCOCET)  mg per tablet, Take 1 tablet by mouth every 8 (eight) hours as needed. , Disp: , Rfl:     potassium chloride SA (K-DUR,KLOR-CON) 10 MEQ tablet, TAKE 1 TABLET DAILY WITH FULL GLASS OF  WATER FOR POTASSIUM *THANK YOU*, Disp: 30 tablet, Rfl: 3    predniSONE (DELTASONE) 5 MG tablet, Take 1 tablet (5 mg total) by mouth once daily., Disp: 30 tablet, Rfl: 6    promethazine (PHENERGAN) 25 MG tablet, Take 1 tablet (25 mg total) by mouth 2 (two) times daily as needed for Nausea., Disp: 60 tablet, Rfl: 3    ranitidine (ZANTAC) 300 MG tablet, Take 300 mg by mouth nightly. , Disp: , Rfl:     selenium sulfide (SELSUN) 2.5 % Susp, Apply topically as needed., Disp: , Rfl:     simvastatin (ZOCOR) 40 MG tablet, Take 40 mg by mouth every evening. , Disp: , Rfl:     temazepam (RESTORIL) 15 mg Cap, TAKE 2 CAPSULES AT BEDTIME AS NEEDED AS DIRECTED *THANK YOU*, Disp: 60 capsule, Rfl: 3    tiZANidine (ZANAFLEX) 4 MG tablet, TAKE 1 TABLET EVERY 12 HOURS *THANK YOU* ** MAY CAUSE DROWSINESS **, Disp: 60 tablet, Rfl: 3    triamterene-hydrochlorothiazide 75-50 mg (MAXZIDE) 75-50 mg per tablet, Take 0.5 tablets by mouth once daily. HOLD for now due to resolving kidney failure. Please follow up with your doctor prior to restarting., Disp: , Rfl:     cyanocobalamin 1,000 mcg/mL injection, Inject 1 mL (1,000 mcg total) into the skin every 7 days., Disp: 10 mL, Rfl: 3    sildenafil (VIAGRA) 100 MG tablet, Take 100 mg by mouth daily as needed for Erectile Dysfunction., Disp: , Rfl:     testosterone cypionate (DEPOTESTOTERONE CYPIONATE) 200 mg/mL injection, Inject 1 mL (200 mg total) into the muscle every 14 (fourteen) days., Disp: 10 mL, Rfl: 2  No current facility-administered medications for this visit.       Review of Systems   Constitutional: Negative for fever.   Eyes: Negative for visual disturbance.   Respiratory: Negative for shortness of breath.    Cardiovascular: Negative for chest pain.   Gastrointestinal: Negative for nausea and vomiting.   Genitourinary: Negative for dysuria and hematuria.   Musculoskeletal: Negative for gait problem.   Skin: Negative for rash.   Neurological: Negative for seizures.    Psychiatric/Behavioral: Negative for confusion.       Objective:      Physical Exam   Constitutional: He is oriented to person, place, and time. He appears well-developed and well-nourished.   HENT:   Head: Normocephalic and atraumatic.   Eyes: Conjunctivae are normal.   Cardiovascular: Normal rate.    Pulmonary/Chest: Effort normal.   Abdominal: Soft. He exhibits no distension. There is no tenderness. Hernia confirmed negative in the right inguinal area and confirmed negative in the left inguinal area.   Genitourinary: Testes normal and penis normal. Rectal exam shows no mass and anal tone normal. Prostate is enlarged (40g, s/s/a). Prostate is not tender.   Musculoskeletal: Normal range of motion. He exhibits no edema.   Neurological: He is alert and oriented to person, place, and time.   Skin: Skin is warm and dry. No rash noted.   Psychiatric: He has a normal mood and affect.   Vitals reviewed.      Assessment:       1. Enlarged prostate with urinary obstruction    2. Nocturia more than twice per night    3. Low testosterone level in male    4. Impotence    5. Screening for prostate cancer        Plan:       Enlarged prostate with urinary obstruction  -     POCT urine dipstick without microscope    Nocturia more than twice per night  -     POCT urine dipstick without microscope    Low testosterone level in male  -     testosterone cypionate injection 200 mg; Inject 1 mL (200 mg total) into the muscle once.  -     testosterone cypionate (DEPOTESTOTERONE CYPIONATE) 200 mg/mL injection; Inject 1 mL (200 mg total) into the muscle every 14 (fourteen) days.  Dispense: 10 mL; Refill: 2  -     Testosterone; Future; Expected date: 09/11/2018  -     Hemoglobin; Future; Expected date: 09/11/2018  -     Hematocrit; Future; Expected date: 09/11/2018  -     POCT urine dipstick without microscope    Impotence  -     POCT urine dipstick without microscope    Screening for prostate cancer  -     PSA, Screening; Future; Expected  date: 09/11/2018  -     POCT urine dipstick without microscope      Begin trial of testosterone replacement.  200 mg IM every 2 weeks.  RTC 3 months with repeat labs.

## 2018-06-11 NOTE — LETTER
June 11, 2018      Gilberto Calvillo MD  [address] [facility] [address] [place] [address]           [facility]  [address] [facility] [address] [place] [address]  Phone: [phone]          Patient: Anoop Womack   MR Number: [id number]   YOB: 1960   Date of Visit: 6/11/2018       Dear Dr. Gilberto Calvillo:    Thank you for referring Anoop Womack to me for evaluation. Attached you will find relevant portions of my assessment and plan of care.    If you have questions, please do not hesitate to call me. I look forward to following Anoop Womack along with you.    Sincerely,    LUDA East MD    Enclosure  CC:  No Recipients    If you would like to receive this communication electronically, please contact [email or web address] or [phone] to request more information on [facility] access.    For providers and/or their staff who would like to refer a patient to [facility], please contact us through our one-stop-shop provider referral line, Jenny Bedolla, at [phone].    If you feel you have received this communication in error or would no longer like to receive these types of communications, please e-mail [email or web address]

## 2018-06-15 DIAGNOSIS — M06.9 RHEUMATOID ARTHRITIS, INVOLVING UNSPECIFIED SITE, UNSPECIFIED RHEUMATOID FACTOR PRESENCE: ICD-10-CM

## 2018-06-15 DIAGNOSIS — M87.00 AVN (AVASCULAR NECROSIS OF BONE): ICD-10-CM

## 2018-06-15 DIAGNOSIS — M80.88XP: ICD-10-CM

## 2018-06-15 DIAGNOSIS — K52.9 GASTROENTERITIS: ICD-10-CM

## 2018-06-15 RX ORDER — METOCLOPRAMIDE 10 MG/1
TABLET ORAL
Qty: 90 TABLET | Refills: 3 | Status: SHIPPED | OUTPATIENT
Start: 2018-06-15

## 2018-06-19 DIAGNOSIS — M80.88XP: ICD-10-CM

## 2018-06-19 DIAGNOSIS — M72.0 DUPUYTREN'S CONTRACTURE OF BOTH HANDS: ICD-10-CM

## 2018-06-19 DIAGNOSIS — E87.6 LOW BLOOD POTASSIUM: ICD-10-CM

## 2018-06-19 DIAGNOSIS — Z86.39 H/O NON ANEMIC VITAMIN B12 DEFICIENCY: ICD-10-CM

## 2018-06-19 DIAGNOSIS — M06.9 RHEUMATOID ARTHRITIS, INVOLVING UNSPECIFIED SITE, UNSPECIFIED RHEUMATOID FACTOR PRESENCE: ICD-10-CM

## 2018-06-19 DIAGNOSIS — K52.9 GASTROENTERITIS: ICD-10-CM

## 2018-06-19 DIAGNOSIS — I10 ESSENTIAL HYPERTENSION: ICD-10-CM

## 2018-06-19 DIAGNOSIS — M87.00 AVN (AVASCULAR NECROSIS OF BONE): ICD-10-CM

## 2018-06-26 RX ORDER — ERGOCALCIFEROL 1.25 MG/1
CAPSULE ORAL
Qty: 4 CAPSULE | Refills: 3 | Status: SHIPPED | OUTPATIENT
Start: 2018-06-26 | End: 2018-10-01 | Stop reason: SDUPTHER

## 2018-06-26 RX ORDER — ADALIMUMAB 40MG/0.8ML
KIT SUBCUTANEOUS
Qty: 2 EACH | Refills: 6 | Status: SHIPPED | OUTPATIENT
Start: 2018-06-26 | End: 2019-02-12 | Stop reason: SDUPTHER

## 2018-06-28 ENCOUNTER — INFUSION (OUTPATIENT)
Dept: INFUSION THERAPY | Facility: HOSPITAL | Age: 58
End: 2018-06-28
Attending: INTERNAL MEDICINE
Payer: MEDICARE

## 2018-06-28 ENCOUNTER — TELEPHONE (OUTPATIENT)
Dept: RHEUMATOLOGY | Facility: CLINIC | Age: 58
End: 2018-06-28

## 2018-06-28 DIAGNOSIS — M80.88XG OSTEOPOROTIC COMPRESSION FRACTURE OF SPINE WITH DELAYED HEALING: Primary | ICD-10-CM

## 2018-06-28 PROCEDURE — 63600175 PHARM REV CODE 636 W HCPCS: Mod: TB,PN | Performed by: INTERNAL MEDICINE

## 2018-06-28 PROCEDURE — 96372 THER/PROPH/DIAG INJ SC/IM: CPT | Mod: PN

## 2018-06-28 RX ADMIN — DENOSUMAB 60 MG: 60 INJECTION SUBCUTANEOUS at 01:06

## 2018-06-28 NOTE — TELEPHONE ENCOUNTER
Contacted patient and informed results received from [facility] collected 6-21-18. Dr Calvillo has reviewed and advised no change in treatment needed at this time. Patient verbalized understanding.

## 2018-07-25 DIAGNOSIS — M06.9 RHEUMATOID ARTHRITIS, INVOLVING UNSPECIFIED SITE, UNSPECIFIED RHEUMATOID FACTOR PRESENCE: ICD-10-CM

## 2018-07-25 DIAGNOSIS — F51.01 PRIMARY INSOMNIA: ICD-10-CM

## 2018-07-25 DIAGNOSIS — M80.88XG OSTEOPOROTIC COMPRESSION FRACTURE OF SPINE WITH DELAYED HEALING: ICD-10-CM

## 2018-07-25 DIAGNOSIS — K04.7 TOOTH ABSCESS: ICD-10-CM

## 2018-07-30 RX ORDER — TEMAZEPAM 15 MG/1
CAPSULE ORAL
Qty: 60 CAPSULE | Refills: 3 | Status: SHIPPED | OUTPATIENT
Start: 2018-07-30 | End: 2018-07-31 | Stop reason: SDUPTHER

## 2018-07-31 DIAGNOSIS — F51.01 PRIMARY INSOMNIA: ICD-10-CM

## 2018-07-31 DIAGNOSIS — M80.88XG OSTEOPOROTIC COMPRESSION FRACTURE OF SPINE WITH DELAYED HEALING: ICD-10-CM

## 2018-07-31 DIAGNOSIS — M06.9 RHEUMATOID ARTHRITIS, INVOLVING UNSPECIFIED SITE, UNSPECIFIED RHEUMATOID FACTOR PRESENCE: ICD-10-CM

## 2018-07-31 DIAGNOSIS — K04.7 TOOTH ABSCESS: ICD-10-CM

## 2018-08-03 RX ORDER — TEMAZEPAM 15 MG/1
CAPSULE ORAL
Qty: 60 CAPSULE | Refills: 3 | Status: SHIPPED | OUTPATIENT
Start: 2018-08-03 | End: 2018-12-27

## 2018-08-07 ENCOUNTER — TELEPHONE (OUTPATIENT)
Dept: PHARMACY | Facility: CLINIC | Age: 58
End: 2018-08-07

## 2018-08-07 NOTE — TELEPHONE ENCOUNTER
Called and spoke with patient notifying him PA for Temazepam 15mg Capsules was approved by his insurance resulting in a $0.00 copayment.    Patient was thankful and would like prescription mailed out to him.  Shipping address verified & [facility] in [place] will ship out.    PA Information:  [facility]   [phone]  Case ID#: [id number]  Approval Dates: 7/8/18-8/7/19  Approved for Temazepam 15 MG #60 per 30 days    Thanks,   Tsering Pollock CPhT B.A  Patient Care Advocate   [facility]  Phone: [phone] Ext [phone]  Fax: [phone]

## 2018-08-21 DIAGNOSIS — I10 ESSENTIAL HYPERTENSION: ICD-10-CM

## 2018-08-21 DIAGNOSIS — Z86.39 H/O NON ANEMIC VITAMIN B12 DEFICIENCY: ICD-10-CM

## 2018-08-21 DIAGNOSIS — M06.9 RHEUMATOID ARTHRITIS OF HAND, UNSPECIFIED LATERALITY, UNSPECIFIED RHEUMATOID FACTOR PRESENCE: ICD-10-CM

## 2018-08-21 DIAGNOSIS — G47.00 INSOMNIA: ICD-10-CM

## 2018-08-21 DIAGNOSIS — E87.6 LOW BLOOD POTASSIUM: ICD-10-CM

## 2018-08-22 RX ORDER — HYDROXYCHLOROQUINE SULFATE 200 MG/1
TABLET, FILM COATED ORAL
Qty: 60 TABLET | Refills: 3 | Status: SHIPPED | OUTPATIENT
Start: 2018-08-22 | End: 2019-01-15 | Stop reason: SDUPTHER

## 2018-09-12 ENCOUNTER — TELEPHONE (OUTPATIENT)
Dept: UROLOGY | Facility: CLINIC | Age: 58
End: 2018-09-12

## 2018-09-12 DIAGNOSIS — R35.1 NOCTURIA: ICD-10-CM

## 2018-09-12 DIAGNOSIS — N40.0 BENIGN PROSTATIC HYPERPLASIA, UNSPECIFIED WHETHER LOWER URINARY TRACT SYMPTOMS PRESENT: Primary | ICD-10-CM

## 2018-09-12 DIAGNOSIS — E29.1 MALE HYPOGONADISM: ICD-10-CM

## 2018-09-12 DIAGNOSIS — Z12.5 SCREENING FOR PROSTATE CANCER: ICD-10-CM

## 2018-09-17 ENCOUNTER — OFFICE VISIT (OUTPATIENT)
Dept: UROLOGY | Facility: CLINIC | Age: 58
End: 2018-09-17
Payer: MEDICARE

## 2018-09-17 VITALS
DIASTOLIC BLOOD PRESSURE: 68 MMHG | SYSTOLIC BLOOD PRESSURE: 111 MMHG | BODY MASS INDEX: 31.5 KG/M2 | WEIGHT: 196 LBS | HEIGHT: 66 IN | HEART RATE: 75 BPM

## 2018-09-17 DIAGNOSIS — R79.89 LOW TESTOSTERONE LEVEL IN MALE: Primary | ICD-10-CM

## 2018-09-17 LAB
BILIRUB SERPL-MCNC: NORMAL MG/DL
BLOOD URINE, POC: NORMAL
COLOR, POC UA: YELLOW
GLUCOSE UR QL STRIP: NORMAL
KETONES UR QL STRIP: NORMAL
LEUKOCYTE ESTERASE URINE, POC: NORMAL
NITRITE, POC UA: NORMAL
PH, POC UA: 6
PROTEIN, POC: NORMAL
SPECIFIC GRAVITY, POC UA: 1.02
UROBILINOGEN, POC UA: NORMAL

## 2018-09-17 PROCEDURE — 81002 URINALYSIS NONAUTO W/O SCOPE: CPT | Mod: PBBFAC,PO | Performed by: UROLOGY

## 2018-09-17 PROCEDURE — 99213 OFFICE O/P EST LOW 20 MIN: CPT | Mod: PBBFAC,PO | Performed by: UROLOGY

## 2018-09-17 PROCEDURE — 99213 OFFICE O/P EST LOW 20 MIN: CPT | Mod: S$PBB,,, | Performed by: UROLOGY

## 2018-09-17 PROCEDURE — 99999 PR PBB SHADOW E&M-EST. PATIENT-LVL III: CPT | Mod: PBBFAC,,, | Performed by: UROLOGY

## 2018-09-17 RX ORDER — TAMSULOSIN HYDROCHLORIDE 0.4 MG/1
0.4 CAPSULE ORAL DAILY
COMMUNITY
End: 2019-04-12

## 2018-09-17 RX ORDER — ZOLPIDEM TARTRATE 10 MG/1
10 TABLET ORAL NIGHTLY
COMMUNITY
End: 2019-01-07

## 2018-09-17 NOTE — PROGRESS NOTES
Subjective:       Patient ID: Anoop Womack is a 58 y.o. male.    Chief Complaint: Follow-up and Low Testosterone    HPI     58 year old with low testosterone.  Baseline 246.  He has been on IM testosterone replacement for the last 3 months.  He gives himself injections at home.  He feels better.  He has improved energy.  Repeat labs are reviewed.  Peak testosteorne is increased to 1191.  PSA 0.3.  H&H normal.    Review of Systems   Constitutional: Negative for fever.   Genitourinary: Negative for dysuria and hematuria.       Objective:      Physical Exam   Constitutional: He is oriented to person, place, and time. He appears well-developed and well-nourished.   Pulmonary/Chest: Effort normal.   Neurological: He is alert and oriented to person, place, and time.   Skin: No rash noted.   Psychiatric: He has a normal mood and affect.   Vitals reviewed.      Assessment:       1. Low testosterone level in male        Plan:       Low testosterone level in male      Continue testosterone at current dose.  RTC 6 months with repeat labs.

## 2018-09-19 DIAGNOSIS — R19.5 YEAST IN STOOL: ICD-10-CM

## 2018-09-19 DIAGNOSIS — T40.2X5A CONSTIPATION DUE TO OPIOID THERAPY: ICD-10-CM

## 2018-09-19 DIAGNOSIS — K52.9 GASTROENTERITIS: ICD-10-CM

## 2018-09-19 DIAGNOSIS — K59.03 CONSTIPATION DUE TO OPIOID THERAPY: ICD-10-CM

## 2018-09-19 DIAGNOSIS — R14.0 ABDOMINAL DISTENSION (GASEOUS): ICD-10-CM

## 2018-09-19 DIAGNOSIS — M06.9 RHEUMATOID ARTHRITIS OF HAND, UNSPECIFIED LATERALITY, UNSPECIFIED RHEUMATOID FACTOR PRESENCE: ICD-10-CM

## 2018-09-21 RX ORDER — POTASSIUM CHLORIDE 750 MG/1
TABLET, EXTENDED RELEASE ORAL
Qty: 30 TABLET | Refills: 3 | Status: SHIPPED | OUTPATIENT
Start: 2018-09-21 | End: 2019-01-08 | Stop reason: SDUPTHER

## 2018-10-02 RX ORDER — ERGOCALCIFEROL 1.25 MG/1
CAPSULE ORAL
Qty: 4 CAPSULE | Refills: 3 | Status: SHIPPED | OUTPATIENT
Start: 2018-10-02 | End: 2019-01-15 | Stop reason: SDUPTHER

## 2018-10-09 DIAGNOSIS — K59.03 CONSTIPATION DUE TO OPIOID THERAPY: ICD-10-CM

## 2018-10-09 DIAGNOSIS — R19.5 YEAST IN STOOL: ICD-10-CM

## 2018-10-09 DIAGNOSIS — K52.9 GASTROENTERITIS: ICD-10-CM

## 2018-10-09 DIAGNOSIS — T40.2X5A CONSTIPATION DUE TO OPIOID THERAPY: ICD-10-CM

## 2018-10-09 DIAGNOSIS — M06.9 RHEUMATOID ARTHRITIS OF HAND, UNSPECIFIED LATERALITY, UNSPECIFIED RHEUMATOID FACTOR PRESENCE: ICD-10-CM

## 2018-10-09 DIAGNOSIS — R14.0 ABDOMINAL DISTENSION (GASEOUS): ICD-10-CM

## 2018-10-10 RX ORDER — TIZANIDINE 4 MG/1
TABLET ORAL
Qty: 60 TABLET | Refills: 3 | Status: SHIPPED | OUTPATIENT
Start: 2018-10-10 | End: 2019-02-05 | Stop reason: SDUPTHER

## 2018-10-23 ENCOUNTER — HOSPITAL ENCOUNTER (OUTPATIENT)
Dept: RADIOLOGY | Facility: HOSPITAL | Age: 58
Discharge: HOME | End: 2018-10-23
Attending: INTERNAL MEDICINE
Payer: MEDICARE

## 2018-10-23 ENCOUNTER — OFFICE VISIT (OUTPATIENT)
Dept: RHEUMATOLOGY | Facility: CLINIC | Age: 58
End: 2018-10-23
Payer: MEDICARE

## 2018-10-23 VITALS
SYSTOLIC BLOOD PRESSURE: 132 MMHG | BODY MASS INDEX: 32.31 KG/M2 | DIASTOLIC BLOOD PRESSURE: 80 MMHG | HEIGHT: 66 IN | HEART RATE: 77 BPM | WEIGHT: 201.06 LBS

## 2018-10-23 DIAGNOSIS — I10 ESSENTIAL HYPERTENSION: ICD-10-CM

## 2018-10-23 DIAGNOSIS — N17.1 ACUTE RENAL FAILURE WITH ACUTE RENAL CORTICAL NECROSIS SUPERIMPOSED ON CHRONIC KIDNEY DISEASE, UNSPECIFIED CKD STAGE: ICD-10-CM

## 2018-10-23 DIAGNOSIS — M80.88XG OSTEOPOROTIC COMPRESSION FRACTURE OF SPINE WITH DELAYED HEALING: ICD-10-CM

## 2018-10-23 DIAGNOSIS — E87.6 LOW BLOOD POTASSIUM: ICD-10-CM

## 2018-10-23 DIAGNOSIS — M54.2 SPINE PAIN, CERVICAL: ICD-10-CM

## 2018-10-23 DIAGNOSIS — N18.9 ACUTE RENAL FAILURE WITH ACUTE RENAL CORTICAL NECROSIS SUPERIMPOSED ON CHRONIC KIDNEY DISEASE, UNSPECIFIED CKD STAGE: ICD-10-CM

## 2018-10-23 DIAGNOSIS — M25.511 CHRONIC RIGHT SHOULDER PAIN: ICD-10-CM

## 2018-10-23 DIAGNOSIS — M54.9 SPINE PAIN, MULTILEVEL: ICD-10-CM

## 2018-10-23 DIAGNOSIS — M06.9 RHEUMATOID ARTHRITIS OF HAND, UNSPECIFIED LATERALITY, UNSPECIFIED RHEUMATOID FACTOR PRESENCE: Primary | ICD-10-CM

## 2018-10-23 DIAGNOSIS — M54.5 ACUTE BILATERAL LOW BACK PAIN, WITH SCIATICA PRESENCE UNSPECIFIED: ICD-10-CM

## 2018-10-23 DIAGNOSIS — G89.29 CHRONIC RIGHT SHOULDER PAIN: ICD-10-CM

## 2018-10-23 DIAGNOSIS — M06.9 RHEUMATOID ARTHRITIS OF HAND, UNSPECIFIED LATERALITY, UNSPECIFIED RHEUMATOID FACTOR PRESENCE: ICD-10-CM

## 2018-10-23 PROCEDURE — 72110 X-RAY EXAM L-2 SPINE 4/>VWS: CPT | Mod: TC,FY,PO

## 2018-10-23 PROCEDURE — 72110 X-RAY EXAM L-2 SPINE 4/>VWS: CPT | Mod: 26,,, | Performed by: RADIOLOGY

## 2018-10-23 PROCEDURE — 72050 X-RAY EXAM NECK SPINE 4/5VWS: CPT | Mod: 26,,, | Performed by: RADIOLOGY

## 2018-10-23 PROCEDURE — 72070 X-RAY EXAM THORAC SPINE 2VWS: CPT | Mod: TC,FY,PO

## 2018-10-23 PROCEDURE — 72050 X-RAY EXAM NECK SPINE 4/5VWS: CPT | Mod: TC,FY,PO

## 2018-10-23 PROCEDURE — 72070 X-RAY EXAM THORAC SPINE 2VWS: CPT | Mod: 26,,, | Performed by: RADIOLOGY

## 2018-10-23 PROCEDURE — 99999 PR PBB SHADOW E&M-EST. PATIENT-LVL V: CPT | Mod: PBBFAC,,, | Performed by: INTERNAL MEDICINE

## 2018-10-23 PROCEDURE — 99215 OFFICE O/P EST HI 40 MIN: CPT | Mod: PBBFAC,25,PO | Performed by: INTERNAL MEDICINE

## 2018-10-23 PROCEDURE — 99213 OFFICE O/P EST LOW 20 MIN: CPT | Mod: S$PBB,,, | Performed by: INTERNAL MEDICINE

## 2018-10-23 NOTE — PROGRESS NOTES
Subjective:       Patient ID: Anoop Womack is a 58 y.o. male.    Chief Complaint: Follow-up      Follow up: psa and osteoporosis and on prolia, and  Humira. Pt was working on his car and the the carmela gave away and the car fell on him.  His cervical and thoracic spine  Is severely painful, and unable to move  L his sholder, it has been about 1 week. Pain is located in multiple joints, both shoulder(s), both elbow(s), both wrist(s), both MCP(s): 1st, 2nd, 3rd, 4th and 5th, both PIP(s): 1st, 2nd, 3rd, 4th and 5th, both DIP(s): 1st and 2nd, both hip(s), both knee(s) and both MTP(s): 1st, 2nd, 3rd, 4th and 5th, is described as aching, pulsating, shooting and throbbing, and is constant, moderate .  Associated symptoms include: crepitation, decreased range of motion, edema, effusion, tenderness and warmth.  Also cr 1.9      Review of Systems   Constitutional: Positive for activity change and fatigue. Negative for appetite change, chills, diaphoresis and unexpected weight change.   HENT: Negative for congestion, ear pain, facial swelling, mouth sores, nosebleeds, postnasal drip, rhinorrhea, sinus pressure, sneezing, sore throat, tinnitus and voice change.    Eyes: Negative for pain, discharge, redness, itching and visual disturbance.   Respiratory: Negative for apnea, cough, chest tightness, shortness of breath and wheezing.    Cardiovascular: Negative for chest pain, palpitations and leg swelling.   Gastrointestinal: Positive for vomiting. Negative for abdominal distention, abdominal pain, constipation, diarrhea and nausea.   Endocrine: Negative for cold intolerance, heat intolerance, polydipsia and polyuria.   Genitourinary: Negative for decreased urine volume, difficulty urinating, flank pain, frequency, hematuria and urgency.   Musculoskeletal: Positive for back pain, gait problem, joint swelling, myalgias, neck pain and neck stiffness. Negative for arthralgias.   Skin: Positive for rash. Negative for pallor and  "wound.   Allergic/Immunologic: Negative for immunocompromised state.   Neurological: Negative for dizziness, tremors, seizures, syncope, weakness and numbness.   Hematological: Negative for adenopathy. Does not bruise/bleed easily.   Psychiatric/Behavioral: Negative for sleep disturbance and suicidal ideas. The patient is not nervous/anxious.          Objective:     /80 (BP Location: Right arm, Patient Position: Sitting, BP Method: Medium (Automatic))   Pulse 77   Ht 5' 6" (1.676 m)   Wt 91.2 kg (201 lb 1 oz)   BMI 32.45 kg/m²      Physical Exam   Vitals reviewed.  Constitutional: He is oriented to person, place, and time. No distress.   HENT:   Head: Normocephalic and atraumatic.   Mouth/Throat: Oropharynx is clear and moist.   Eyes: EOM are normal. Pupils are equal, round, and reactive to light.   Neck: Neck supple. No thyromegaly present.   Cardiovascular: Normal rate, regular rhythm and normal heart sounds.  Exam reveals no gallop and no friction rub.    No murmur heard.  Pulmonary/Chest: Breath sounds normal. He has no wheezes. He has no rales. He exhibits no tenderness.   Abdominal: There is no tenderness. There is no rebound and no guarding.       Right Side Rheumatological Exam     The patient is tender to palpation of the shoulder, elbow, wrist, knee, 1st PIP, 1st MCP, 2nd PIP, 2nd MCP, 3rd PIP, 3rd MCP, 4th PIP, 4th MCP and 5th PIP    He has swelling of the elbow, wrist, knee, 1st PIP, 1st MCP, 2nd PIP, 2nd MCP, 3rd PIP, 3rd MCP, 4th PIP, 4th MCP, 5th PIP and 5th MCP    The patient has an enlarged wrist and knee    Shoulder Exam   Tenderness Location: no tenderness    Range of Motion   Active abduction: abnormal   Adduction: abnormal  Sensation: normal    Knee Exam   Tenderness Location: medial joint line and LCL  Patellofemoral Crepitus: positive  Effusion: positive  Sensation: normal    Hip Exam   Tenderness Location: posterior and anterior  Sensation: normal    Elbow/Wrist Exam   Tenderness " Location: no tenderness  Sensation: normal    Left Side Rheumatological Exam     The patient is tender to palpation of the shoulder, elbow, wrist, knee, 1st PIP, 1st MCP, 2nd PIP, 2nd MCP, 3rd PIP, 3rd MCP, 4th PIP, 4th MCP, 5th PIP and 5th MCP.    He has swelling of the wrist, knee, 1st PIP, 1st MCP, 2nd PIP, 2nd MCP, 3rd PIP, 3rd MCP, 4th PIP, 4th MCP, 5th PIP and 5th MCP    The patient has an enlarged knee.    Shoulder Exam   Tenderness Location: no tenderness    Range of Motion   Active abduction: abnormal   Sensation: normal    Knee Exam   Tenderness Location: lateral joint line and medial joint line    Patellofemoral Crepitus: positive  Effusion: positive  Sensation: normal    Hip Exam   Tenderness Location: posterior and anterior  Sensation: normal    Elbow/Wrist Exam   Sensation: normal      Back/Neck Exam   General Inspection   Gait: normal       Tenderness Right paramedian tenderness of the Upper C-Spine, Lower C-Spine, Lower L-Spine and SI Joint.Left paramedian tenderness of the Upper C-Spine, Lower L-Spine, Lower C-Spine and SI Joint.    Neck Range of Motion   Flexion: Limited  Extension: Limited  Lymphadenopathy:     He has no cervical adenopathy.   Neurological: He is alert and oriented to person, place, and time. Gait normal.   Skin: No rash noted. No erythema. No pallor.     Psychiatric: Mood and affect normal.   Musculoskeletal: He exhibits tenderness and deformity. He exhibits no edema.   L hip pain and si joint pain            Results for orders placed or performed in visit on 09/17/18   POCT urine dipstick without microscope   Result Value Ref Range    Color, UA yellow     Spec Grav UA 1.020     pH, UA 6     WBC, UA -     Nitrite, UA -     Protein -     Glucose, UA -     Ketones, UA -     Urobilinogen, UA -     Bilirubin -     Blood, UA -      Result Narrative   EXAM:  CT CHEST WO CONTRAST    CLINICAL HISTORY: Trauma.    TECHNIQUE: Routine chest CT protocol was performed without contrast. All CT  scans at [this location] are performed using dose modulation techniques as appropriate to a performed exam including the following: automated exposure control; adjustment of the   mA and/or kV according to patient size (this includes techniques or standardized protocols for targeted exams where dose is matched to indication / reason for exam; i.e. extremities or head); use of iterative reconstruction technique.     COMPARISON: No prior CT is available for comparison.    FINDINGS:  No evidence of mediastinal hematoma. The external lumen of the aorta is smooth. No pleural effusion or pneumothorax. No endobronchial or endotracheal lesions are evident. There is no cardiomegaly or pericardial effusion. Coronary artery   calcifications. No suspicious or acute osseous abnormalities. Old posterior right ninth rib fracture. Limited images through the upper abdomen demonstrate no acute abnormality.      IMPRESSION:     No signs of traumatic injury to the chest.     Glomerular Filtration Rate (10/11/2018 3:42 PM CDT)  Glomerular Filtration Rate (10/11/2018 3:42 PM CDT)   Component Value Ref Range Performed At   GFR Non African American 37 (A) >59 mL/min     GFR  44 (A)  Comment:                STAGES OF CHRONIC KIDNEY DISEASE  STAGE          DESCRIPTION           GFR(mL/min/1.73 m2)  3         Moderate decrease GFR            30-59  4           Severe decrease GFR            15-29  5              Kidney Failure         <15 (or dialysis)  Chronic kidney disease is defined as either kidney damage  or GFR <60mL/min/1.73 m2 for >=3 months. Kidney damage is  defined as pathologic abnormalities or markers of damage,  including abnormalities in blood or urine tests or imaging  studies.   >59 mL/min       Glomerular Filtration Rate (10/11/2018 3:42 PM CDT)   Specimen   Blood     Glomerular Filtration Rate (10/11/2018 3:42 PM CDT)   Performing Organization Address City/State/Zipcode Phone Number   [facility]              Back to top of Lab Results       Basic metabolic panel (10/11/2018 3:42 PM CDT)  Basic metabolic panel (10/11/2018 3:42 PM CDT)   Component Value Ref Range Performed At   Glucose 109 (H) 65 - 99 mg/dL     Sodium 136 136 - 144 mmol/L     Potassium 4.5 3.6 - 5.1 mmol/L     Chloride 101 101 - 111 mmol/L     CO2 26 22 - 32 mmol/L     BUN 17 8 - 20 mg/dL     Calcium 8.5 (L) 8.9 - 10.3 mg/dL     Creatinine 1.90 (H) 0.90 - 1.30 mg/dL     Anion Gap 9 7 - 16 mmol/L       Basic metabolic panel (10/11/2018 3:42 PM CDT)   Specimen   Blood - Blood     Basic metabolic panel (10/11/2018 3:42 PM CDT)   Performing Organization Address City/State/Zipcode Phone Number   [place]             Back to top of Lab Results       CBC with Differential (10/11/2018 3:42 PM CDT)  CBC with Differential (10/11/2018 3:42 PM CDT)   Component Value Ref Range Performed At   WBC 10.0 4.8 - 10.8 10*3/uL     RBC 4.08 (L) 4.70 - 6.10 10*6/uL     HGB 13.0 (L) 14.0 - 18.0 g/dL     HCT 37.9 (L) 42.0 - 52.0 %     MCV 93.0 80.0 - 94.0 fL     MCH 32.0 (H) 27.0 - 31.0 pg     MCHC 34.4 33.0 - 37.0 g/dL     RDW 14.9 (H) 11.5 - 14.5 %     Platelet Count 154 130 - 400 10*3/uL     MPV 8.7 7.4 - 10.4 fL     Neutrophils Percent 77.0 (H) 36.0 - 66.0 %     Lymphocytes Percent 14.0 (L) 21.0 - 50.0 %     Monocytes Percent 8.0 2.0 - 10.0 %     Eosinophils Percent 1.0 0.0 - 10.0 %     Basophils Percent 1.0 0.0 - 1.0 %     Neutrophils Absolute 7.7 (H) 1.4 - 6.5 10*3/uL     Lymphocytes Absolute 1.4 1.2 - 3.4 10*3/uL     Monocytes Absolute 0.8 0.1 - 1.0 10*3/uL     Eosinophils Absolute 0.1 0.0 - 0.7 10*3/uL     Basophils Absolute 0.1           Assessment:         Encounter Diagnoses   Name Primary?    Rheumatoid arthritis of hand, unspecified laterality, unspecified rheumatoid factor presence Yes    Osteoporotic compression fracture of spine with delayed healing     Essential hypertension     Low blood potassium     Spine pain, cervical     Spine pain, multilevel      Acute bilateral low back pain, with sciatica presence unspecified     Chronic right shoulder pain     Acute renal failure with acute renal cortical necrosis superimposed on chronic kidney disease, unspecified CKD stage          Plan:       Rheumatoid arthritis of hand, unspecified laterality, unspecified rheumatoid factor presence  -     CBC auto differential; Future; Expected date: 10/23/2018  -     Comprehensive metabolic panel; Future; Expected date: 10/23/2018  -     Sedimentation rate; Future; Expected date: 10/23/2018  -     C-reactive protein; Future; Expected date: 10/23/2018  -     Aldolase; Future; Expected date: 10/23/2018  -     CK; Future; Expected date: 10/23/2018  -     Ambulatory consult to Orthopedics    Osteoporotic compression fracture of spine with delayed healing  -     X-Ray Cervical Spine Complete 5 view; Future; Expected date: 10/23/2018  -     X-Ray Thoracic Spine AP Lateral; Future; Expected date: 10/23/2018  -     X-Ray Lumbar Spine Complete 5 View; Future; Expected date: 10/23/2018  -     CBC auto differential; Future; Expected date: 10/23/2018  -     Comprehensive metabolic panel; Future; Expected date: 10/23/2018  -     Sedimentation rate; Future; Expected date: 10/23/2018  -     C-reactive protein; Future; Expected date: 10/23/2018  -     Aldolase; Future; Expected date: 10/23/2018  -     CK; Future; Expected date: 10/23/2018  -     Ambulatory consult to Orthopedics    Essential hypertension  -     CBC auto differential; Future; Expected date: 10/23/2018  -     Comprehensive metabolic panel; Future; Expected date: 10/23/2018  -     Sedimentation rate; Future; Expected date: 10/23/2018  -     C-reactive protein; Future; Expected date: 10/23/2018  -     Aldolase; Future; Expected date: 10/23/2018  -     CK; Future; Expected date: 10/23/2018  -     Ambulatory consult to Orthopedics    Low blood potassium  -     CBC auto differential; Future; Expected date: 10/23/2018  -      Comprehensive metabolic panel; Future; Expected date: 10/23/2018  -     Sedimentation rate; Future; Expected date: 10/23/2018  -     C-reactive protein; Future; Expected date: 10/23/2018  -     Aldolase; Future; Expected date: 10/23/2018  -     CK; Future; Expected date: 10/23/2018  -     Ambulatory consult to Orthopedics    Spine pain, cervical  -     X-Ray Cervical Spine Complete 5 view; Future; Expected date: 10/23/2018  -     X-Ray Thoracic Spine AP Lateral; Future; Expected date: 10/23/2018  -     X-Ray Lumbar Spine Complete 5 View; Future; Expected date: 10/23/2018  -     CBC auto differential; Future; Expected date: 10/23/2018  -     Comprehensive metabolic panel; Future; Expected date: 10/23/2018  -     Sedimentation rate; Future; Expected date: 10/23/2018  -     C-reactive protein; Future; Expected date: 10/23/2018  -     Aldolase; Future; Expected date: 10/23/2018  -     CK; Future; Expected date: 10/23/2018  -     Ambulatory consult to Orthopedics    Spine pain, multilevel  -     X-Ray Cervical Spine Complete 5 view; Future; Expected date: 10/23/2018  -     X-Ray Thoracic Spine AP Lateral; Future; Expected date: 10/23/2018  -     X-Ray Lumbar Spine Complete 5 View; Future; Expected date: 10/23/2018  -     CBC auto differential; Future; Expected date: 10/23/2018  -     Comprehensive metabolic panel; Future; Expected date: 10/23/2018  -     Sedimentation rate; Future; Expected date: 10/23/2018  -     C-reactive protein; Future; Expected date: 10/23/2018  -     Aldolase; Future; Expected date: 10/23/2018  -     CK; Future; Expected date: 10/23/2018  -     Ambulatory consult to Orthopedics    Acute bilateral low back pain, with sciatica presence unspecified  -     X-Ray Cervical Spine Complete 5 view; Future; Expected date: 10/23/2018  -     X-Ray Thoracic Spine AP Lateral; Future; Expected date: 10/23/2018  -     X-Ray Lumbar Spine Complete 5 View; Future; Expected date: 10/23/2018  -     CBC auto  differential; Future; Expected date: 10/23/2018  -     Comprehensive metabolic panel; Future; Expected date: 10/23/2018  -     Sedimentation rate; Future; Expected date: 10/23/2018  -     C-reactive protein; Future; Expected date: 10/23/2018  -     Aldolase; Future; Expected date: 10/23/2018  -     CK; Future; Expected date: 10/23/2018  -     Ambulatory consult to Orthopedics    Chronic right shoulder pain  -     CBC auto differential; Future; Expected date: 10/23/2018  -     Comprehensive metabolic panel; Future; Expected date: 10/23/2018  -     Sedimentation rate; Future; Expected date: 10/23/2018  -     C-reactive protein; Future; Expected date: 10/23/2018  -     Aldolase; Future; Expected date: 10/23/2018  -     CK; Future; Expected date: 10/23/2018  -     Ambulatory consult to Orthopedics    continue prolia, and humira and refer ortho  Hold maxide and labs and xrays today

## 2018-10-24 ENCOUNTER — TELEPHONE (OUTPATIENT)
Dept: RHEUMATOLOGY | Facility: CLINIC | Age: 58
End: 2018-10-24

## 2018-10-24 DIAGNOSIS — M25.512 ACUTE PAIN OF LEFT SHOULDER: Primary | ICD-10-CM

## 2018-10-24 NOTE — TELEPHONE ENCOUNTER
----- Message from Camilla Youngblood sent at 10/24/2018 11:37 AM CDT -----  Contact: Self  Patient left a voice mail message at 11:21 that he is returning a call.  Please call.

## 2018-10-24 NOTE — TELEPHONE ENCOUNTER
Contacted patient and informed no one from this office called. Patient stated it was the orthopedic office calling to schedule appointment.

## 2018-11-08 RX ORDER — LUBIPROSTONE 24 UG/1
CAPSULE, GELATIN COATED ORAL
Qty: 30 CAPSULE | Refills: 3 | Status: SHIPPED | OUTPATIENT
Start: 2018-11-08 | End: 2019-01-15 | Stop reason: SDUPTHER

## 2018-11-23 ENCOUNTER — TELEPHONE (OUTPATIENT)
Dept: RHEUMATOLOGY | Facility: CLINIC | Age: 58
End: 2018-11-23

## 2018-11-23 DIAGNOSIS — S32.009A CLOSED FRACTURE DISLOCATION OF LUMBAR SPINE, INITIAL ENCOUNTER: Primary | ICD-10-CM

## 2018-11-23 NOTE — TELEPHONE ENCOUNTER
Attempted to contact patient regarding results. Left a message to contact office to schedule appointment for MRI of T spine L spine possible fracture

## 2018-11-23 NOTE — TELEPHONE ENCOUNTER
----- Message from Gilberto Calvillo MD sent at 10/31/2018  3:24 PM CDT -----  Please schedule Mr. Almaguer an MRI of his thoracic/lumbar spine it appears that he may have another compression fracture

## 2018-11-23 NOTE — TELEPHONE ENCOUNTER
----- Message from Gilberto Calvillo MD sent at 10/31/2018  3:24 PM CDT -----  Patient has severe spinal stenosis osteoarthritis however there are no fractures in the cervical spine

## 2018-11-28 ENCOUNTER — TELEPHONE (OUTPATIENT)
Dept: RHEUMATOLOGY | Facility: CLINIC | Age: 58
End: 2018-11-28

## 2018-11-28 NOTE — TELEPHONE ENCOUNTER
----- Message from Yane Fish sent at 11/28/2018  9:37 AM CST -----  Contact: pt            Name of Who is Calling: pt       What is the request in detail: needs to speak to nurse regarding xray results. Please call pt      Can the clinic reply by [facility]: no      [facility]: [phone]

## 2018-11-28 NOTE — TELEPHONE ENCOUNTER
----- Message from Aida Funez sent at 11/27/2018  3:51 PM CST -----  Contact: pt   States he's calling to schedule his appt for his test results and discuss MRI and state's he's waiting by the phone & can be reached at [phone]//thanks/dbw

## 2018-11-28 NOTE — TELEPHONE ENCOUNTER
Contacted patient and scheduled MRI appointment verified with radiology that MRI was safe for patient since has hardware in wrist. Office was informed patient can have MRI with no trouble. Nurse informed patient verified date and time of scheduled MRI patient is aware.

## 2018-12-05 DIAGNOSIS — Z86.39 H/O NON ANEMIC VITAMIN B12 DEFICIENCY: ICD-10-CM

## 2018-12-05 DIAGNOSIS — I10 ESSENTIAL HYPERTENSION: ICD-10-CM

## 2018-12-05 DIAGNOSIS — G47.00 INSOMNIA: ICD-10-CM

## 2018-12-05 DIAGNOSIS — E87.6 LOW BLOOD POTASSIUM: ICD-10-CM

## 2018-12-05 DIAGNOSIS — M06.9 RHEUMATOID ARTHRITIS OF HAND, UNSPECIFIED LATERALITY, UNSPECIFIED RHEUMATOID FACTOR PRESENCE: ICD-10-CM

## 2018-12-12 DIAGNOSIS — Z86.39 H/O NON ANEMIC VITAMIN B12 DEFICIENCY: ICD-10-CM

## 2018-12-12 DIAGNOSIS — G47.00 INSOMNIA, UNSPECIFIED TYPE: ICD-10-CM

## 2018-12-12 DIAGNOSIS — M80.88XG OSTEOPOROTIC COMPRESSION FRACTURE OF SPINE WITH DELAYED HEALING: ICD-10-CM

## 2018-12-12 DIAGNOSIS — I10 ESSENTIAL HYPERTENSION: ICD-10-CM

## 2018-12-12 DIAGNOSIS — E87.6 LOW BLOOD POTASSIUM: ICD-10-CM

## 2018-12-12 RX ORDER — PREDNISONE 5 MG/1
TABLET ORAL
Qty: 30 TABLET | Refills: 6 | Status: SHIPPED | OUTPATIENT
Start: 2018-12-12

## 2018-12-14 ENCOUNTER — HOSPITAL ENCOUNTER (OUTPATIENT)
Dept: RADIOLOGY | Facility: HOSPITAL | Age: 58
Discharge: HOME | End: 2018-12-14
Attending: INTERNAL MEDICINE
Payer: MEDICARE

## 2018-12-14 DIAGNOSIS — Z87.821 HISTORY OF FOREIGN BODY IN EYE: ICD-10-CM

## 2018-12-14 DIAGNOSIS — S32.009A CLOSED FRACTURE DISLOCATION OF LUMBAR SPINE, INITIAL ENCOUNTER: ICD-10-CM

## 2018-12-14 PROCEDURE — 72148 MRI LUMBAR SPINE W/O DYE: CPT | Mod: 26,,, | Performed by: RADIOLOGY

## 2018-12-14 PROCEDURE — 72148 MRI LUMBAR SPINE W/O DYE: CPT | Mod: TC,PO

## 2018-12-14 PROCEDURE — 70030 X-RAY EYE FOR FOREIGN BODY: CPT | Mod: TC,PO

## 2018-12-14 PROCEDURE — 70030 X-RAY EYE FOR FOREIGN BODY: CPT | Mod: 26,,, | Performed by: RADIOLOGY

## 2018-12-14 RX ORDER — PROMETHAZINE HYDROCHLORIDE 25 MG/1
TABLET ORAL
Qty: 60 TABLET | Refills: 5 | Status: SHIPPED | OUTPATIENT
Start: 2018-12-14 | End: 2019-06-13 | Stop reason: SDUPTHER

## 2018-12-17 ENCOUNTER — TELEPHONE (OUTPATIENT)
Dept: RHEUMATOLOGY | Facility: CLINIC | Age: 58
End: 2018-12-17

## 2018-12-17 DIAGNOSIS — L40.50 PSORIATIC ARTHRITIS: ICD-10-CM

## 2018-12-17 DIAGNOSIS — M47.816 FACET ARTHRITIS OF LUMBAR REGION: ICD-10-CM

## 2018-12-17 DIAGNOSIS — M51.36 DEGENERATIVE DISC DISEASE, LUMBAR: Primary | ICD-10-CM

## 2018-12-17 DIAGNOSIS — S22.080A T12 COMPRESSION FRACTURE: ICD-10-CM

## 2018-12-17 NOTE — TELEPHONE ENCOUNTER
----- Message from Ciara Ochoa sent at 12/17/2018  3:44 PM CST -----  Contact: self  Type:  Test Results    Who Called: patient   Name of Test (Lab/Mammo/Etc):MRI  Date of Test:  12/15  Ordering Provider: Rajinder  Where the test was performed:  [address] [place]   Best Call Back Number: [phone] (home)     Additional Information:

## 2018-12-18 NOTE — TELEPHONE ENCOUNTER
----- Message from Ciara May sent at 12/18/2018 12:10 PM CST -----  Contact: self  Type:  Test Results    Who Called: patient   Name of Test (Lab/Mammo/Etc):  Xray and Mri  Date of Test: 12/15  Ordering Provider: Rajinder  Where the test was performed:  [address] [place]   Best Call Back Number:  [phone] (home)     Additional Information:

## 2018-12-18 NOTE — TELEPHONE ENCOUNTER
Contacted patient and informed Dr Calvillo has not reviewed results yet. Will try and have her review today and call back with results. Verbalized understanding.

## 2018-12-24 NOTE — TELEPHONE ENCOUNTER
MRI L-spine: extensive multilevel degenerative changes and facet disease with some spinal stenosis and foraminal stenosis. There is a mild compression fracture at T12 which appears to be chronic (no new fracture) is seen.  I have placed referral to pain management for evaluation if he is interested in this.

## 2018-12-27 RX ORDER — ZOLPIDEM TARTRATE 10 MG/1
TABLET ORAL
Qty: 30 TABLET | Refills: 3 | Status: SHIPPED | OUTPATIENT
Start: 2018-12-27 | End: 2019-07-03 | Stop reason: SDUPTHER

## 2018-12-28 ENCOUNTER — INFUSION (OUTPATIENT)
Dept: INFUSION THERAPY | Facility: HOSPITAL | Age: 58
End: 2018-12-28
Attending: INTERNAL MEDICINE
Payer: MEDICARE

## 2018-12-28 ENCOUNTER — TELEPHONE (OUTPATIENT)
Dept: RHEUMATOLOGY | Facility: CLINIC | Age: 58
End: 2018-12-28

## 2018-12-28 DIAGNOSIS — M80.88XG OSTEOPOROTIC COMPRESSION FRACTURE OF SPINE WITH DELAYED HEALING: Primary | ICD-10-CM

## 2018-12-28 PROCEDURE — 63600175 PHARM REV CODE 636 W HCPCS: Mod: TB,PN | Performed by: PHYSICIAN ASSISTANT

## 2018-12-28 PROCEDURE — 96372 THER/PROPH/DIAG INJ SC/IM: CPT | Mod: PN

## 2018-12-28 RX ADMIN — DENOSUMAB 60 MG: 60 INJECTION SUBCUTANEOUS at 03:12

## 2019-01-07 DIAGNOSIS — I10 ESSENTIAL HYPERTENSION: ICD-10-CM

## 2019-01-07 DIAGNOSIS — M06.9 RHEUMATOID ARTHRITIS OF HAND, UNSPECIFIED LATERALITY, UNSPECIFIED RHEUMATOID FACTOR PRESENCE: ICD-10-CM

## 2019-01-07 DIAGNOSIS — Z86.39 H/O NON ANEMIC VITAMIN B12 DEFICIENCY: ICD-10-CM

## 2019-01-07 DIAGNOSIS — G47.00 INSOMNIA: ICD-10-CM

## 2019-01-07 DIAGNOSIS — E87.6 LOW BLOOD POTASSIUM: ICD-10-CM

## 2019-01-07 NOTE — TELEPHONE ENCOUNTER
Spoke to pt, advised of results and recommendations. He reports he already sees Dr. Harding and would like results sent over to his office. No further questions. Results faxed to Dr. Harding.

## 2019-01-08 DIAGNOSIS — K52.9 GASTROENTERITIS: ICD-10-CM

## 2019-01-08 DIAGNOSIS — K59.03 CONSTIPATION DUE TO OPIOID THERAPY: ICD-10-CM

## 2019-01-08 DIAGNOSIS — R19.5 YEAST IN STOOL: ICD-10-CM

## 2019-01-08 DIAGNOSIS — R79.89 LOW TESTOSTERONE LEVEL IN MALE: ICD-10-CM

## 2019-01-08 DIAGNOSIS — T40.2X5A CONSTIPATION DUE TO OPIOID THERAPY: ICD-10-CM

## 2019-01-08 DIAGNOSIS — M06.9 RHEUMATOID ARTHRITIS OF HAND, UNSPECIFIED LATERALITY, UNSPECIFIED RHEUMATOID FACTOR PRESENCE: ICD-10-CM

## 2019-01-08 DIAGNOSIS — R14.0 ABDOMINAL DISTENSION (GASEOUS): ICD-10-CM

## 2019-01-08 RX ORDER — TESTOSTERONE CYPIONATE 200 MG/ML
INJECTION, SOLUTION INTRAMUSCULAR
Qty: 10 ML | Refills: 2 | Status: SHIPPED | OUTPATIENT
Start: 2019-01-08

## 2019-01-13 RX ORDER — POTASSIUM CHLORIDE 750 MG/1
TABLET, EXTENDED RELEASE ORAL
Qty: 30 TABLET | Refills: 3 | Status: SHIPPED | OUTPATIENT
Start: 2019-01-13 | End: 2019-07-03 | Stop reason: SDUPTHER

## 2019-01-13 RX ORDER — ZOLPIDEM TARTRATE 10 MG/1
TABLET ORAL
Qty: 30 TABLET | Refills: 3 | OUTPATIENT
Start: 2019-01-13

## 2019-01-14 ENCOUNTER — TELEPHONE (OUTPATIENT)
Dept: RHEUMATOLOGY | Facility: CLINIC | Age: 59
End: 2019-01-14

## 2019-01-14 NOTE — TELEPHONE ENCOUNTER
Attempted to contact patient regarding appointment for tomorrow. Left a message with time of appointment. Advised to contact office if need to cancel or reschedule.

## 2019-01-15 ENCOUNTER — OFFICE VISIT (OUTPATIENT)
Dept: RHEUMATOLOGY | Facility: CLINIC | Age: 59
End: 2019-01-15
Payer: COMMERCIAL

## 2019-01-15 VITALS
WEIGHT: 194.75 LBS | HEART RATE: 81 BPM | BODY MASS INDEX: 31.3 KG/M2 | DIASTOLIC BLOOD PRESSURE: 73 MMHG | SYSTOLIC BLOOD PRESSURE: 125 MMHG | HEIGHT: 66 IN

## 2019-01-15 DIAGNOSIS — M05.79 RHEUMATOID ARTHRITIS INVOLVING MULTIPLE SITES WITH POSITIVE RHEUMATOID FACTOR: Primary | ICD-10-CM

## 2019-01-15 DIAGNOSIS — Z79.899 IMMUNOCOMPROMISED STATE DUE TO DRUG THERAPY: ICD-10-CM

## 2019-01-15 DIAGNOSIS — E66.9 OBESITY (BMI 30.0-34.9): ICD-10-CM

## 2019-01-15 DIAGNOSIS — D84.821 IMMUNOCOMPROMISED STATE DUE TO DRUG THERAPY: ICD-10-CM

## 2019-01-15 DIAGNOSIS — K04.7 TOOTH ABSCESS: ICD-10-CM

## 2019-01-15 DIAGNOSIS — M80.88XG OSTEOPOROTIC COMPRESSION FRACTURE OF SPINE WITH DELAYED HEALING: ICD-10-CM

## 2019-01-15 DIAGNOSIS — Z79.52 CURRENT CHRONIC USE OF SYSTEMIC STEROIDS: ICD-10-CM

## 2019-01-15 DIAGNOSIS — T40.2X5A CONSTIPATION DUE TO OPIOID THERAPY: ICD-10-CM

## 2019-01-15 DIAGNOSIS — E55.9 VITAMIN D DEFICIENCY: ICD-10-CM

## 2019-01-15 DIAGNOSIS — K21.9 GASTROESOPHAGEAL REFLUX DISEASE, ESOPHAGITIS PRESENCE NOT SPECIFIED: ICD-10-CM

## 2019-01-15 DIAGNOSIS — R73.9 ELEVATED BLOOD SUGAR: ICD-10-CM

## 2019-01-15 DIAGNOSIS — I10 ESSENTIAL HYPERTENSION: ICD-10-CM

## 2019-01-15 DIAGNOSIS — K59.03 CONSTIPATION DUE TO OPIOID THERAPY: ICD-10-CM

## 2019-01-15 PROCEDURE — 96372 THER/PROPH/DIAG INJ SC/IM: CPT | Mod: S$GLB,,, | Performed by: PHYSICIAN ASSISTANT

## 2019-01-15 PROCEDURE — 3008F PR BODY MASS INDEX (BMI) DOCUMENTED: ICD-10-PCS | Mod: CPTII,S$GLB,, | Performed by: PHYSICIAN ASSISTANT

## 2019-01-15 PROCEDURE — 3074F PR MOST RECENT SYSTOLIC BLOOD PRESSURE < 130 MM HG: ICD-10-PCS | Mod: CPTII,S$GLB,, | Performed by: PHYSICIAN ASSISTANT

## 2019-01-15 PROCEDURE — 99999 PR PBB SHADOW E&M-EST. PATIENT-LVL V: CPT | Mod: PBBFAC,,, | Performed by: PHYSICIAN ASSISTANT

## 2019-01-15 PROCEDURE — 99214 OFFICE O/P EST MOD 30 MIN: CPT | Mod: 25,S$GLB,, | Performed by: PHYSICIAN ASSISTANT

## 2019-01-15 PROCEDURE — 3008F BODY MASS INDEX DOCD: CPT | Mod: CPTII,S$GLB,, | Performed by: PHYSICIAN ASSISTANT

## 2019-01-15 PROCEDURE — 3078F PR MOST RECENT DIASTOLIC BLOOD PRESSURE < 80 MM HG: ICD-10-PCS | Mod: CPTII,S$GLB,, | Performed by: PHYSICIAN ASSISTANT

## 2019-01-15 PROCEDURE — 3078F DIAST BP <80 MM HG: CPT | Mod: CPTII,S$GLB,, | Performed by: PHYSICIAN ASSISTANT

## 2019-01-15 PROCEDURE — 99214 PR OFFICE/OUTPT VISIT, EST, LEVL IV, 30-39 MIN: ICD-10-PCS | Mod: 25,S$GLB,, | Performed by: PHYSICIAN ASSISTANT

## 2019-01-15 PROCEDURE — 99999 PR PBB SHADOW E&M-EST. PATIENT-LVL V: ICD-10-PCS | Mod: PBBFAC,,, | Performed by: PHYSICIAN ASSISTANT

## 2019-01-15 PROCEDURE — 96372 PR INJECTION,THERAP/PROPH/DIAG2ST, IM OR SUBCUT: ICD-10-PCS | Mod: S$GLB,,, | Performed by: PHYSICIAN ASSISTANT

## 2019-01-15 PROCEDURE — 3074F SYST BP LT 130 MM HG: CPT | Mod: CPTII,S$GLB,, | Performed by: PHYSICIAN ASSISTANT

## 2019-01-15 RX ORDER — LUBIPROSTONE 24 UG/1
CAPSULE ORAL
Qty: 30 CAPSULE | Refills: 5 | Status: SHIPPED | OUTPATIENT
Start: 2019-01-15 | End: 2019-01-15

## 2019-01-15 RX ORDER — HYDROXYCHLOROQUINE SULFATE 200 MG/1
200 TABLET, FILM COATED ORAL 2 TIMES DAILY
Qty: 60 TABLET | Refills: 5 | Status: SHIPPED | OUTPATIENT
Start: 2019-01-15

## 2019-01-15 RX ORDER — KETOROLAC TROMETHAMINE 30 MG/ML
60 INJECTION, SOLUTION INTRAMUSCULAR; INTRAVENOUS
Status: COMPLETED | OUTPATIENT
Start: 2019-01-15 | End: 2019-01-15

## 2019-01-15 RX ORDER — ERGOCALCIFEROL 1.25 MG/1
CAPSULE ORAL
Qty: 4 CAPSULE | Refills: 5 | Status: SHIPPED | OUTPATIENT
Start: 2019-01-15

## 2019-01-15 RX ORDER — TRIAMTERENE AND HYDROCHLOROTHIAZIDE 75; 50 MG/1; MG/1
0.5 TABLET ORAL DAILY
COMMUNITY

## 2019-01-15 RX ORDER — CEPHALEXIN 500 MG/1
500 CAPSULE ORAL EVERY 12 HOURS
Qty: 20 CAPSULE | Refills: 1 | Status: SHIPPED | OUTPATIENT
Start: 2019-01-15 | End: 2019-01-25

## 2019-01-15 RX ORDER — METHYLPREDNISOLONE ACETATE 80 MG/ML
80 INJECTION, SUSPENSION INTRA-ARTICULAR; INTRALESIONAL; INTRAMUSCULAR; SOFT TISSUE
Status: COMPLETED | OUTPATIENT
Start: 2019-01-15 | End: 2019-01-15

## 2019-01-15 RX ADMIN — METHYLPREDNISOLONE ACETATE 80 MG: 80 INJECTION, SUSPENSION INTRA-ARTICULAR; INTRALESIONAL; INTRAMUSCULAR; SOFT TISSUE at 10:01

## 2019-01-15 RX ADMIN — KETOROLAC TROMETHAMINE 60 MG: 30 INJECTION, SOLUTION INTRAMUSCULAR; INTRAVENOUS at 10:01

## 2019-01-15 NOTE — TELEPHONE ENCOUNTER
Amitizia was denied by his insurance company. They are recommending that we prescribe linzess as a covered alternative. I have sent that to his pharmacy.

## 2019-01-15 NOTE — PROGRESS NOTES
Administered 1 cc DepoMedrol 80mg/cc  to right ventrogluteal. Pt tolerated well. No acute reaction noted to site. Pt instructed on S/S to report. Advised patient to wait in lobby 15 minutes after receiving injection to monitor for any reactions..  Pt verbalized understanding.     Lot: [id number]  Exp: 11/19  Administered 2 cc  Toradol 30mg/cc  to right dorsogluteal. Pt tolerated well. No acute reaction noted to site. Pt instructed on S/S to report. Advised patient to wait in lobby 15 minutes after receiving injection to monitor for any reactions. Pt verbalized understanding.     Lot: [id number]  Exp: 08/2020

## 2019-01-15 NOTE — PROGRESS NOTES
Subjective:       Patient ID: Anoop Womack is a 58 y.o. male.    Chief Complaint: Results and Pain    Mr. Womack is a 58 year old male who presents to clinic for follow up on seropositive (+RF) rheumatoid arthritis, psoriatic arthritis, and osteoporosis. He is a new patient to me. Since his last visit he underwent x-ray of c-spine, t-spine, and lumbar spine and there was concern for new compression fracture. MRI L-spine did not confirm new fracture, but he did have evidence of chronic compression fx and significant multilevel DDD and spinal stenosis. He plans to f/u with Dr. Harding for further evaluation. With regards to his RA, he is doing fair. He complains of pain involving his MCPs, PIPs, and wrists, in addition to his hip and back pain. He reports morning stiffness lasting until noon. He recently got dental insurance, but has been suffering with recurrent dental abscess and is not scheduled for dental eval yet. He is due for eye exam and biologic monitoring labs. We reviewed x-rays, MRI, and October labs in detail today.    Current Treatment:  1. Humira SC q 14 days  2. Plaquenil 200 mg BID  3. Prednisone 5 mg daily  4. Prolia SC every 6 months    Pain management and narcotic prescriptions from Dr. Harding.       Review of Systems   Constitutional: Positive for activity change. Negative for chills, fatigue and fever.   Eyes: Negative for visual disturbance.   Respiratory: Negative for cough, shortness of breath and wheezing.    Cardiovascular: Negative for chest pain, palpitations and leg swelling.   Gastrointestinal: Positive for constipation. Negative for abdominal pain, diarrhea, nausea and vomiting.   Musculoskeletal: Positive for arthralgias, back pain and joint swelling. Negative for myalgias.   Allergic/Immunologic: Positive for immunocompromised state.   Neurological: Negative for dizziness, syncope and headaches.   Hematological: Negative for adenopathy.         Objective:     Vitals:    01/15/19  0908   BP: 125/73   Pulse: 81       Past Medical History:   Diagnosis Date    Arthritis     Asthma     Chronic back pain     Compression fracture     from trauma    COPD (chronic obstructive pulmonary disease)     Diverticulitis     Gastroparesis     GERD (gastroesophageal reflux disease)     Hypertension     Thyroid disease      Past Surgical History:   Procedure Laterality Date    CHOLECYSTECTOMY      CORONARY ANGIOPLASTY      angiogram    HERNIA REPAIR      knee scope      WRIST FRACTURE SURGERY Bilateral steel plate in both          Physical Exam   Constitutional:   Obese body habitus.   Eyes: Right conjunctiva is not injected. Left conjunctiva is not injected. Right eye exhibits normal extraocular motion. Left eye exhibits normal extraocular motion.   Neck: No JVD present. No thyromegaly present.   Cardiovascular: Normal rate and regular rhythm.  Exam reveals no decreased pulses.    Pulmonary/Chest: He has no wheezes. He has no rhonchi. He has no rales.       Right Side Rheumatological Exam     Examination finds the elbow normal.    The patient is tender to palpation of the wrist, 1st PIP, 1st MCP, 2nd PIP, 2nd MCP, 3rd PIP, 3rd MCP, 4th PIP, 4th MCP, 5th PIP and 5th MCP    He has swelling of the 1st MCP and 2nd MCP    The patient has an enlarged 1st MCP, 2nd MCP and 3rd MCP    Left Side Rheumatological Exam     Examination finds the elbow normal.    The patient is tender to palpation of the wrist, 1st PIP, 1st MCP, 2nd PIP, 2nd MCP, 3rd PIP, 3rd MCP, 4th PIP, 4th MCP, 5th PIP and 5th MCP.    He has swelling of the wrist, 3rd MCP, 4th MCP and 5th PIP      Lymphadenopathy:     He has no cervical adenopathy.   Neurological: Gait normal.   Skin: No purpura noted. Rash is not nodular and not urticarial. No cyanosis.     Psychiatric: Mood and affect normal.   Musculoskeletal: He exhibits tenderness and deformity.   Contractures 3rd, 4th, 5th digits on the L hand and contracture of 4th digit on the R  hand.           Assessment:       1. Rheumatoid arthritis involving multiple sites with positive rheumatoid factor    2. Osteoporotic compression fracture of spine with delayed healing    3. Current chronic use of systemic steroids    4. Immunocompromised state due to drug therapy    5. Essential hypertension    6. Tooth abscess    7. Constipation due to opioid therapy    8. Vitamin D deficiency    9. Gastroesophageal reflux disease, esophagitis presence not specified    10. Elevated blood sugar    11. Obesity (BMI 30.0-34.9)          Component      Latest Ref Rng & Units 10/23/2018           2:09 PM   Baso #      0.00 - 0.20 K/uL 0.03   nRBC      0 /100 WBC 0   Gran%      38.0 - 73.0 % 77.5 (H)   Lymph%      18.0 - 48.0 % 12.5 (L)   Mono%      4.0 - 15.0 % 7.2   Eosinophil%      0.0 - 8.0 % 1.6   Basophil%      0.0 - 1.9 % 0.3   Differential Method       Automated   Sodium      136 - 145 mmol/L 137   Potassium      3.5 - 5.1 mmol/L 4.2   Chloride      95 - 110 mmol/L 100   CO2      23 - 29 mmol/L 30 (H)   Glucose      70 - 110 mg/dL 140 (H)   BUN, Bld      6 - 20 mg/dL 22 (H)   Creatinine      0.5 - 1.4 mg/dL 1.4   Calcium      8.7 - 10.5 mg/dL 9.6   Total Protein      6.0 - 8.4 g/dL 7.7   Albumin      3.5 - 5.2 g/dL 3.7   Total Bilirubin      0.1 - 1.0 mg/dL 0.5   Alkaline Phosphatase      55 - 135 U/L 144 (H)   AST      10 - 40 U/L 17   ALT      10 - 44 U/L 19   Anion Gap      8 - 16 mmol/L 7 (L)   eGFR if African American      >60 mL/min/1.73 m:2 >60.0   eGFR if non African American      >60 mL/min/1.73 m:2 55.4 (A)   Sed Rate      0 - 10 mm/Hr 33 (H)   Rheumatoid Factor      0.0 - 15.0 IU/mL 23.0 (H)   CCP Antibodies      <5.0 U/mL 3.1   CRP      0.0 - 8.2 mg/L 80.7 (H)   Aldolase      1.2 - 7.6 U/L 4.7   CPK      20 - 200 U/L 100     MRI L-spine:  Impression       1.  There is extensive multilevel degenerative change including degenerative disc and facet disease resulting in some degree of spinal canal and  foraminal stenosis.  These findings will be summarized below.    2.  There is a mild chronic anterior wedge compression deformity of T12 as well as mild superior endplate depression of L1 with associated Schmorl's node.  These findings are chronic.  There is no acute fracture.    3.  There is no spinal canal or foraminal stenosis at the Y66-93-Y50-64 or T12-L1 levels.    4.  At the L1-2 level there is mild right foraminal stenosis without spinal stenosis.    5.  At the L2-3 level there is mild left lateral recess stenosis with mild right but moderate to severe left foraminal stenosis without spinal stenosis.    6.  At the L3-4 level there is moderate-to-marked left mild right stenosis without spinal stenosis.    7.  At the L4-5 level there is moderate spinal stenosis marked bilateral foraminal stenosis.  There is extensive Modic type 1 degenerative signal change where there is marked disc space narrowing.    8.  At the L5-S1 level there is moderate-to-marked right and mild-to-moderate left foraminal stenosis without spinal stenosis.       Plan:       Rheumatoid arthritis involving multiple sites with positive rheumatoid factor  -     hydroxychloroquine (PLAQUENIL) 200 mg tablet; Take 1 tablet (200 mg total) by mouth 2 (two) times daily.  Dispense: 60 tablet; Refill: 5  -     ketorolac injection 60 mg  -     methylPREDNISolone acetate injection 80 mg  -     C-reactive protein; Future; Expected date: 01/15/2019  -     Sedimentation rate, automated; Future; Expected date: 01/15/2019  -     Hepatitis B core antibody, IgM; Future; Expected date: 01/15/2019  -     Hepatitis C antibody; Future; Expected date: 01/15/2019  -     Quantiferon Gold TB; Future; Expected date: 01/15/2019    Osteoporotic compression fracture of spine with delayed healing  -     Vitamin D; Future; Expected date: 01/15/2019    Current chronic use of systemic steroids    Immunocompromised state due to drug therapy    Essential hypertension    Tooth  abscess  -     cephALEXin (KEFLEX) 500 MG capsule; Take 1 capsule (500 mg total) by mouth every 12 (twelve) hours. for 10 days  Dispense: 20 capsule; Refill: 1    Constipation due to opioid therapy  -     lubiprostone (AMITIZA) 24 MCG Cap; TAKE ONE CAPSULE EVERY MORNING WITH BREAKFAST FOR IRRITABLE BOWEL SYNDROME / HARD STOOLS *THANK YOU*  Dispense: 30 capsule; Refill: 5    Vitamin D deficiency  -     ergocalciferol (VITAMIN D2) 50,000 unit Cap; TAKE ONE CAPSULE EVERY 7 DAYS *THANK YOU*  Dispense: 4 capsule; Refill: 5    Gastroesophageal reflux disease, esophagitis presence not specified  -     ranitidine (ZANTAC) 300 MG tablet; Take 1 tablet (300 mg total) by mouth nightly.  Dispense: 90 tablet; Refill: 3    Elevated blood sugar  -     Hemoglobin A1c; Future; Expected date: 01/15/2019    Obesity (BMI 30.0-34.9)        Plan:  1. Check labs soon: repeat ESR/CRP, HgbA1c, Hep B/C, Quant TB, Vit D  2. Continue Humira q 14 days. Hold for dental infection until abx completed or infection resolves.  3. Continue  mg BID. Reminded pt to follow up with annual eye exam.  4. Continue low dose prednisone 5 mg daily. Will check for pre-diabetes.   5. Depo 80/ toradol 60 mg given in clinic today  6. Follow up with pain management as scheduled- MRI report and x-rays reports provided to patient  7. Follow up with PCP for HTN and Obesity management    Follow up: 3 months with Dr. Calvillo

## 2019-01-15 NOTE — TELEPHONE ENCOUNTER
Notified pt that Amitiza not covered by insurance and Linzess was sent as alternative. Pt verbalized understanding.

## 2019-01-21 ENCOUNTER — TELEPHONE (OUTPATIENT)
Dept: RHEUMATOLOGY | Facility: CLINIC | Age: 59
End: 2019-01-21

## 2019-01-21 DIAGNOSIS — Z79.899 LONG-TERM USE OF PLAQUENIL: Primary | ICD-10-CM

## 2019-01-21 NOTE — TELEPHONE ENCOUNTER
We did not set up his Optometry exam at his last visit check out. I have placed referral-- Please call and schedule at his convenience for plaquenil monitoring or let him know he follow with someone closer to home if would like, but I didn't want him to forget about it.

## 2019-01-21 NOTE — TELEPHONE ENCOUNTER
----- Message from Payal Staley PA-C sent at 1/15/2019  9:57 AM CST -----  Set up for eye exam when labs reviewed

## 2019-01-22 DIAGNOSIS — K04.7 TOOTH ABSCESS: ICD-10-CM

## 2019-01-22 DIAGNOSIS — M06.9 RHEUMATOID ARTHRITIS, INVOLVING UNSPECIFIED SITE, UNSPECIFIED RHEUMATOID FACTOR PRESENCE: ICD-10-CM

## 2019-01-22 DIAGNOSIS — M80.88XG OSTEOPOROTIC COMPRESSION FRACTURE OF SPINE WITH DELAYED HEALING: ICD-10-CM

## 2019-01-22 DIAGNOSIS — F51.01 PRIMARY INSOMNIA: ICD-10-CM

## 2019-01-22 RX ORDER — TEMAZEPAM 15 MG/1
CAPSULE ORAL
Qty: 60 CAPSULE | Refills: 3 | Status: SHIPPED | OUTPATIENT
Start: 2019-01-22

## 2019-01-25 LAB
1,25(OH)2D SERPL-MCNC: 91 PG/ML (ref 18–72)
1,25(OH)2D2 SERPL-MCNC: 70 PG/ML
1,25(OH)2D3 SERPL-MCNC: 21 PG/ML
CRP SERPL-MCNC: 32.4 MG/L
ERYTHROCYTE [SEDIMENTATION RATE] IN BLOOD BY WESTERGREN METHOD: 2 MM/H
HBA1C MFR BLD: 4.7 % OF TOTAL HGB
HBV CORE IGM SERPL QL IA: NORMAL
HCV AB S/CO SERPL IA: 0.06
HCV AB SERPL QL IA: NORMAL
M TB IFN-G BLD-IMP: NEGATIVE
M TB IFN-G CD4+ BCKGRND COR BLD-ACNC: 0.01 IU/ML
M TB IFN-G CD4+ T-CELLS BLD-ACNC: 0.03 IU/ML
M TB TUBERC IGNF/MITOGEN IGNF CONTROL: 9.02 IU/ML
TB2 - NIL: 0 IU/ML

## 2019-01-30 ENCOUNTER — TELEPHONE (OUTPATIENT)
Dept: RHEUMATOLOGY | Facility: CLINIC | Age: 59
End: 2019-01-30

## 2019-01-31 NOTE — TELEPHONE ENCOUNTER
----- Message from Emerita Ames sent at 1/30/2019 11:28 AM CST -----  Contact: Patient  Type: Needs Medical Advice    Who Called:  Anoop Romano Call Back Number:   Additional Information: Patient is calling to see if he can get an approval on a 90 script on his medication that Dr. Calvillo is giving him.Please call back and advise.

## 2019-01-31 NOTE — TELEPHONE ENCOUNTER
Contacted patient regarding 90 day supply on Restoril. Informed a message will be sent to Dr Calvillo requesting a 90 day supply on Restoril. Patient verbalized understanding,

## 2019-02-05 DIAGNOSIS — T40.2X5A CONSTIPATION DUE TO OPIOID THERAPY: ICD-10-CM

## 2019-02-05 DIAGNOSIS — R19.5 YEAST IN STOOL: ICD-10-CM

## 2019-02-05 DIAGNOSIS — K52.9 GASTROENTERITIS: ICD-10-CM

## 2019-02-05 DIAGNOSIS — K59.03 CONSTIPATION DUE TO OPIOID THERAPY: ICD-10-CM

## 2019-02-05 DIAGNOSIS — M06.9 RHEUMATOID ARTHRITIS OF HAND, UNSPECIFIED LATERALITY, UNSPECIFIED RHEUMATOID FACTOR PRESENCE: ICD-10-CM

## 2019-02-05 DIAGNOSIS — R14.0 ABDOMINAL DISTENSION (GASEOUS): ICD-10-CM

## 2019-02-06 RX ORDER — TIZANIDINE 4 MG/1
TABLET ORAL
Qty: 60 TABLET | Refills: 3 | Status: SHIPPED | OUTPATIENT
Start: 2019-02-06

## 2019-02-12 DIAGNOSIS — M72.0 DUPUYTREN'S CONTRACTURE OF BOTH HANDS: ICD-10-CM

## 2019-02-12 DIAGNOSIS — K52.9 GASTROENTERITIS: ICD-10-CM

## 2019-02-12 DIAGNOSIS — E87.6 LOW BLOOD POTASSIUM: ICD-10-CM

## 2019-02-12 DIAGNOSIS — M80.88XP: ICD-10-CM

## 2019-02-12 DIAGNOSIS — M87.00 AVN (AVASCULAR NECROSIS OF BONE): ICD-10-CM

## 2019-02-12 DIAGNOSIS — Z86.39 H/O NON ANEMIC VITAMIN B12 DEFICIENCY: ICD-10-CM

## 2019-02-12 DIAGNOSIS — I10 ESSENTIAL HYPERTENSION: ICD-10-CM

## 2019-02-12 DIAGNOSIS — M06.9 RHEUMATOID ARTHRITIS, INVOLVING UNSPECIFIED SITE, UNSPECIFIED RHEUMATOID FACTOR PRESENCE: ICD-10-CM

## 2019-02-14 RX ORDER — ADALIMUMAB 40MG/0.8ML
40 KIT SUBCUTANEOUS
Qty: 2 PEN | Refills: 11 | Status: SHIPPED | OUTPATIENT
Start: 2019-02-14 | End: 2019-02-26

## 2019-02-18 ENCOUNTER — TELEPHONE (OUTPATIENT)
Dept: PHARMACY | Facility: CLINIC | Age: 59
End: 2019-02-18

## 2019-02-25 NOTE — TELEPHONE ENCOUNTER
DOCUMENTATION ONLY:  Prior authorization for Humira Pen 40mg/0.8mL approved from 02/22/2019 to 02/22/2021.    Case ID# None    Co-pay: $8.50    Patient Assistance IS NOT required.     Forward to clinical pharmacist for consult & shipment.

## 2019-02-26 NOTE — TELEPHONE ENCOUNTER
----- Message from Alina Tapia PharmD sent at 2/26/2019 11:14 AM CST -----  Regarding: Humira  Good morning,    Mr. Womack would like to continue filling Humira at [facility]. Prior authorization has been approved until 2/22/2021. Please send prescription to [facility], which is added to the patients EPIC profile. Patient has been notified and provided with the necessary info to call and schedule a delivery.    To complete this in EPIC, the original order MUST be discontinued and re-typed as a new prescription with the updated pharmacy listed. Clicking reorder will continue to route the rx to [facility] even if the pharmacy is changed. Please opt the patient out of [facility] when the BPA is fired.    Thank you,  Alina Tapia, PharmD  Clinical Pharmacist  [facility]  [phone]

## 2019-03-07 ENCOUNTER — TELEPHONE (OUTPATIENT)
Dept: RHEUMATOLOGY | Facility: CLINIC | Age: 59
End: 2019-03-07

## 2019-03-07 ENCOUNTER — TELEPHONE (OUTPATIENT)
Dept: OPTOMETRY | Facility: CLINIC | Age: 59
End: 2019-03-07

## 2019-03-07 DIAGNOSIS — R19.5 YEAST IN STOOL: ICD-10-CM

## 2019-03-07 DIAGNOSIS — M06.9 RHEUMATOID ARTHRITIS OF HAND, UNSPECIFIED LATERALITY, UNSPECIFIED RHEUMATOID FACTOR PRESENCE: ICD-10-CM

## 2019-03-07 DIAGNOSIS — R14.0 ABDOMINAL DISTENSION (GASEOUS): ICD-10-CM

## 2019-03-07 DIAGNOSIS — T40.2X5A CONSTIPATION DUE TO OPIOID THERAPY: ICD-10-CM

## 2019-03-07 DIAGNOSIS — K59.03 CONSTIPATION DUE TO OPIOID THERAPY: ICD-10-CM

## 2019-03-07 DIAGNOSIS — K52.9 GASTROENTERITIS: ICD-10-CM

## 2019-03-07 RX ORDER — ONDANSETRON 8 MG/1
TABLET, ORALLY DISINTEGRATING ORAL
Qty: 45 TABLET | Refills: 6 | Status: SHIPPED | OUTPATIENT
Start: 2019-03-07 | End: 2019-04-12 | Stop reason: SDUPTHER

## 2019-03-07 NOTE — TELEPHONE ENCOUNTER
Refill of zofran sent to pharmacy, but he also has phenergan on his list--he should not be taking both medications together.

## 2019-03-07 NOTE — TELEPHONE ENCOUNTER
Attempted to return patient call regarding medication refill. Left a message informing medication can be mailed to his home from [facility]. If this is not good for him advised to have [facility] contact [facility] to transfer script

## 2019-03-07 NOTE — TELEPHONE ENCOUNTER
----- Message from Destini Boland sent at 3/7/2019  2:33 PM CST -----  Type:  RX Refill Request    Who Called:  Patient  RX Name and Strength: promethazine (PHENERGAN) 25 MG tablet  Preferred Pharmacy with phone number:  [facility]  Best Call Back Number:  [phone]  Additional Information:  Please call patient back to advise.

## 2019-03-07 NOTE — TELEPHONE ENCOUNTER
----- Message from Ciara Ochoa sent at 3/7/2019  4:49 PM CST -----  Contact: self  Patient need to speak to nurse regarding medication promethazine (PHENERGAN) 25 MG tablet     Need to be sent to     [facility] - [facility] - [address] [place] [id number]  Phone: [phone] Fax: [phone]      Patient states medication refill was sent to [facility] pharmacy and that is too far per patient       Please call patient at [phone] (home)

## 2019-03-07 NOTE — TELEPHONE ENCOUNTER
Attempted to contact patient regarding refill request. Left a message informing that has several refills available at [facility] here in [place]. Advised to contact office with any questions.

## 2019-03-19 DIAGNOSIS — M06.9 RHEUMATOID ARTHRITIS INVOLVING MULTIPLE SITES, UNSPECIFIED RHEUMATOID FACTOR PRESENCE: Primary | ICD-10-CM

## 2019-03-19 RX ORDER — ADALIMUMAB 40MG/0.8ML
40 KIT SUBCUTANEOUS
Qty: 2 PEN | Refills: 6 | Status: SHIPPED | OUTPATIENT
Start: 2019-03-19 | End: 2019-06-11

## 2019-03-20 RX ORDER — SIMVASTATIN 40 MG/1
40 TABLET, FILM COATED ORAL NIGHTLY
Qty: 30 TABLET | Refills: 4 | Status: CANCELLED | OUTPATIENT
Start: 2019-03-20

## 2019-03-20 RX ORDER — TAMSULOSIN HYDROCHLORIDE 0.4 MG/1
0.4 CAPSULE ORAL DAILY
Qty: 30 CAPSULE | Refills: 4 | Status: CANCELLED | OUTPATIENT
Start: 2019-03-20

## 2019-03-25 ENCOUNTER — TELEPHONE (OUTPATIENT)
Dept: RHEUMATOLOGY | Facility: CLINIC | Age: 59
End: 2019-03-25

## 2019-03-25 NOTE — TELEPHONE ENCOUNTER
Returned call and informed script was sent to local pharmacy 5 days ago. [employer] will cancel this request.

## 2019-03-25 NOTE — TELEPHONE ENCOUNTER
----- Message from RT Elzbieta sent at 3/25/2019  3:18 PM CDT -----  Contact: Nithin, [phone] [facility]  Nithin, [phone] [facility], requesting new prescription for medication; Humira, thanks.

## 2019-04-12 ENCOUNTER — OFFICE VISIT (OUTPATIENT)
Dept: RHEUMATOLOGY | Facility: CLINIC | Age: 59
End: 2019-04-12
Payer: MEDICARE

## 2019-04-12 VITALS
DIASTOLIC BLOOD PRESSURE: 76 MMHG | SYSTOLIC BLOOD PRESSURE: 117 MMHG | BODY MASS INDEX: 29.73 KG/M2 | WEIGHT: 185 LBS | HEART RATE: 60 BPM | HEIGHT: 66 IN

## 2019-04-12 DIAGNOSIS — Z79.899 IMMUNOCOMPROMISED STATE DUE TO DRUG THERAPY: ICD-10-CM

## 2019-04-12 DIAGNOSIS — M06.9 RHEUMATOID ARTHRITIS OF HAND, UNSPECIFIED LATERALITY, UNSPECIFIED RHEUMATOID FACTOR PRESENCE: Primary | ICD-10-CM

## 2019-04-12 DIAGNOSIS — Z79.52 CURRENT CHRONIC USE OF SYSTEMIC STEROIDS: ICD-10-CM

## 2019-04-12 DIAGNOSIS — Z79.899 LONG-TERM USE OF PLAQUENIL: ICD-10-CM

## 2019-04-12 DIAGNOSIS — D84.821 IMMUNOCOMPROMISED STATE DUE TO DRUG THERAPY: ICD-10-CM

## 2019-04-12 PROCEDURE — 96372 THER/PROPH/DIAG INJ SC/IM: CPT | Mod: 59,S$GLB,, | Performed by: INTERNAL MEDICINE

## 2019-04-12 PROCEDURE — 3008F PR BODY MASS INDEX (BMI) DOCUMENTED: ICD-10-PCS | Mod: CPTII,S$GLB,, | Performed by: INTERNAL MEDICINE

## 2019-04-12 PROCEDURE — 99214 PR OFFICE/OUTPT VISIT, EST, LEVL IV, 30-39 MIN: ICD-10-PCS | Mod: 25,S$GLB,, | Performed by: INTERNAL MEDICINE

## 2019-04-12 PROCEDURE — 3078F PR MOST RECENT DIASTOLIC BLOOD PRESSURE < 80 MM HG: ICD-10-PCS | Mod: CPTII,S$GLB,, | Performed by: INTERNAL MEDICINE

## 2019-04-12 PROCEDURE — 99999 PR PBB SHADOW E&M-EST. PATIENT-LVL III: ICD-10-PCS | Mod: PBBFAC,,, | Performed by: INTERNAL MEDICINE

## 2019-04-12 PROCEDURE — 3074F PR MOST RECENT SYSTOLIC BLOOD PRESSURE < 130 MM HG: ICD-10-PCS | Mod: CPTII,S$GLB,, | Performed by: INTERNAL MEDICINE

## 2019-04-12 PROCEDURE — 99999 PR PBB SHADOW E&M-EST. PATIENT-LVL III: CPT | Mod: PBBFAC,,, | Performed by: INTERNAL MEDICINE

## 2019-04-12 PROCEDURE — 3078F DIAST BP <80 MM HG: CPT | Mod: CPTII,S$GLB,, | Performed by: INTERNAL MEDICINE

## 2019-04-12 PROCEDURE — 96372 PR INJECTION,THERAP/PROPH/DIAG2ST, IM OR SUBCUT: ICD-10-PCS | Mod: 59,S$GLB,, | Performed by: INTERNAL MEDICINE

## 2019-04-12 PROCEDURE — 3074F SYST BP LT 130 MM HG: CPT | Mod: CPTII,S$GLB,, | Performed by: INTERNAL MEDICINE

## 2019-04-12 PROCEDURE — 99214 OFFICE O/P EST MOD 30 MIN: CPT | Mod: 25,S$GLB,, | Performed by: INTERNAL MEDICINE

## 2019-04-12 PROCEDURE — 3008F BODY MASS INDEX DOCD: CPT | Mod: CPTII,S$GLB,, | Performed by: INTERNAL MEDICINE

## 2019-04-12 RX ORDER — CIPROFLOXACIN 500 MG/1
500 TABLET ORAL
COMMUNITY
Start: 2019-02-13 | End: 2019-04-12 | Stop reason: ALTCHOICE

## 2019-04-12 RX ORDER — SIMVASTATIN 40 MG/1
40 TABLET, FILM COATED ORAL NIGHTLY
Qty: 90 TABLET | Refills: 3 | Status: SHIPPED | OUTPATIENT
Start: 2019-04-12 | End: 2020-04-11

## 2019-04-12 RX ORDER — METHYLPREDNISOLONE ACETATE 80 MG/ML
80 INJECTION, SUSPENSION INTRA-ARTICULAR; INTRALESIONAL; INTRAMUSCULAR; SOFT TISSUE
Status: COMPLETED | OUTPATIENT
Start: 2019-04-12 | End: 2019-04-12

## 2019-04-12 RX ORDER — MIRTAZAPINE 45 MG/1
45 TABLET, FILM COATED ORAL NIGHTLY
Qty: 30 TABLET | Refills: 11 | Status: SHIPPED | OUTPATIENT
Start: 2019-04-12 | End: 2020-04-11

## 2019-04-12 RX ORDER — TAMSULOSIN HYDROCHLORIDE 0.4 MG/1
0.4 CAPSULE ORAL DAILY
Qty: 30 CAPSULE | Refills: 11 | Status: SHIPPED | OUTPATIENT
Start: 2019-04-12 | End: 2020-04-11

## 2019-04-12 RX ORDER — NITROGLYCERIN 0.4 MG/1
0.4 TABLET SUBLINGUAL EVERY 5 MIN PRN
Qty: 25 TABLET | Refills: 5 | Status: SHIPPED | OUTPATIENT
Start: 2019-04-12 | End: 2020-04-11

## 2019-04-12 RX ADMIN — METHYLPREDNISOLONE ACETATE 80 MG: 80 INJECTION, SUSPENSION INTRA-ARTICULAR; INTRALESIONAL; INTRAMUSCULAR; SOFT TISSUE at 02:04

## 2019-04-12 ASSESSMENT — ROUTINE ASSESSMENT OF PATIENT INDEX DATA (RAPID3)
PAIN SCORE: 7.5
PATIENT GLOBAL ASSESSMENT SCORE: 7
TOTAL RAPID3 SCORE: 5.28
MDHAQ FUNCTION SCORE: .4
PSYCHOLOGICAL DISTRESS SCORE: 2.2

## 2019-04-12 NOTE — PROGRESS NOTES
Subjective:       Patient ID: Anoop Womack is a 58 y.o. male.    Chief Complaint: Rheumatoid Arthritis     Mr. Womack is a 58 year old male who presents to clinic for follow up on seropositive (+RF) rheumatoid arthritis, psoriatic arthritis, and osteoporosis.Since his last visit he underwent x-ray of c-spine, t-spine, and lumbar spine and there was concern for new compression fracture. MRI L-spine did not confirm new fracture, but he did have evidence of chronic compression fx and significant multilevel DDD and spinal stenosis.  With regards to his RA, he is doing fair. He complains of pain involving his MCPs, PIPs, and wrists, in addition to his hip and back pain. He reports morning stiffness lasting until noon. He recently got dental insurance, but has been suffering with recurrent dental abscess and is not scheduled for dental eval yet. He is due for eye exam and biologic monitoring labs.   1. Humira SC q 14 days  2. Plaquenil 200 mg BID  3. Prednisone 5 mg daily  4. Prolia SC every 6 months    .       Review of Systems   Constitutional: Positive for activity change and fatigue. Negative for appetite change, chills, diaphoresis and unexpected weight change.   HENT: Negative for congestion, ear pain, facial swelling, mouth sores, nosebleeds, postnasal drip, rhinorrhea, sinus pressure, sneezing, sore throat, tinnitus and voice change.    Eyes: Negative for pain, discharge, redness, itching and visual disturbance.   Respiratory: Negative for apnea, cough, chest tightness, shortness of breath and wheezing.    Cardiovascular: Negative for chest pain, palpitations and leg swelling.   Gastrointestinal: Positive for vomiting. Negative for abdominal distention, abdominal pain, constipation, diarrhea and nausea.   Endocrine: Negative for cold intolerance, heat intolerance, polydipsia and polyuria.   Genitourinary: Negative for decreased urine volume, difficulty urinating, flank pain, frequency, hematuria and urgency.  "  Musculoskeletal: Positive for back pain, gait problem, joint swelling, myalgias, neck pain and neck stiffness. Negative for arthralgias.   Skin: Positive for rash. Negative for pallor and wound.   Allergic/Immunologic: Negative for immunocompromised state.   Neurological: Negative for dizziness, tremors, seizures, syncope, weakness and numbness.   Hematological: Negative for adenopathy. Does not bruise/bleed easily.   Psychiatric/Behavioral: Negative for sleep disturbance and suicidal ideas. The patient is not nervous/anxious.          Objective:     /76 (BP Location: Left arm, Patient Position: Sitting, BP Method: Medium (Automatic))   Pulse 60   Ht 5' 6" (1.676 m)   Wt 83.9 kg (185 lb)   BMI 29.86 kg/m²      Physical Exam   Vitals reviewed.  Constitutional: He is oriented to person, place, and time. No distress.   HENT:   Head: Normocephalic and atraumatic.   Mouth/Throat: Oropharynx is clear and moist.   Eyes: EOM are normal. Pupils are equal, round, and reactive to light.   Neck: Neck supple. No thyromegaly present.   Cardiovascular: Normal rate, regular rhythm and normal heart sounds.  Exam reveals no gallop and no friction rub.    No murmur heard.  Pulmonary/Chest: Breath sounds normal. He has no wheezes. He has no rales. He exhibits no tenderness.   Abdominal: There is no tenderness. There is no rebound and no guarding.       Right Side Rheumatological Exam     The patient is tender to palpation of the shoulder, elbow, wrist, knee, 1st PIP, 1st MCP, 2nd PIP, 2nd MCP, 3rd PIP, 3rd MCP, 4th PIP, 4th MCP and 5th PIP    He has swelling of the elbow, wrist, knee, 1st PIP, 1st MCP, 2nd PIP, 2nd MCP, 3rd PIP, 3rd MCP, 4th PIP, 4th MCP, 5th PIP and 5th MCP    The patient has an enlarged wrist and knee    Shoulder Exam   Tenderness Location: no tenderness    Range of Motion   Active abduction: abnormal   Adduction: abnormal  Sensation: normal    Knee Exam   Tenderness Location: medial joint line and " LCL  Patellofemoral Crepitus: positive  Effusion: positive  Sensation: normal    Hip Exam   Tenderness Location: posterior and anterior  Sensation: normal    Elbow/Wrist Exam   Tenderness Location: no tenderness  Sensation: normal    Left Side Rheumatological Exam     The patient is tender to palpation of the shoulder, elbow, wrist, knee, 1st PIP, 1st MCP, 2nd PIP, 2nd MCP, 3rd PIP, 3rd MCP, 4th PIP, 4th MCP, 5th PIP and 5th MCP.    He has swelling of the wrist, knee, 1st PIP, 1st MCP, 2nd PIP, 2nd MCP, 3rd PIP, 3rd MCP, 4th PIP, 4th MCP, 5th PIP and 5th MCP    The patient has an enlarged knee.    Shoulder Exam   Tenderness Location: no tenderness    Range of Motion   Active abduction: abnormal   Sensation: normal    Knee Exam   Tenderness Location: lateral joint line and medial joint line    Patellofemoral Crepitus: positive  Effusion: positive  Sensation: normal    Hip Exam   Tenderness Location: posterior and anterior  Sensation: normal    Elbow/Wrist Exam   Sensation: normal      Back/Neck Exam   General Inspection   Gait: normal       Tenderness Right paramedian tenderness of the Upper C-Spine, Lower C-Spine, Lower L-Spine and SI Joint.Left paramedian tenderness of the Upper C-Spine, Lower L-Spine, Lower C-Spine and SI Joint.    Neck Range of Motion   Flexion: Limited  Extension: Limited  Lymphadenopathy:     He has no cervical adenopathy.   Neurological: He is alert and oriented to person, place, and time. Gait normal.   Skin: No rash noted. No erythema. No pallor.     Psychiatric: Mood and affect normal.   Musculoskeletal: He exhibits tenderness and deformity. He exhibits no edema.   L hip pain and si joint pain            Results for orders placed or performed in visit on 01/15/19   C-reactive protein   Result Value Ref Range    CRP 32.4 (H) <8.0 mg/L   Sedimentation rate, automated   Result Value Ref Range    Sed Rate 2 < OR = 20 mm/h   Hepatitis B core antibody, IgM   Result Value Ref Range    Hep B C IgM  NON-REACTIVE NON-REACTIVE   Hepatitis C antibody   Result Value Ref Range    Hepatitis C Ab NON-REACTIVE NON-REACTIVE    Signal/Cutoff 0.06 <1.00   Vitamin D   Result Value Ref Range    Vitamin D, 1,25 (OH)2 91 (H) 18 - 72 pg/mL    Vitamin D3, 1,25 (OH)2 21 pg/mL    Vitamin D2, 1,25 (OH)2 70 pg/mL   Hemoglobin A1c   Result Value Ref Range    Hemoglobin A1C 4.7 <5.7 % of total Hgb   QuantiFERON-TB Gold Plus   Result Value Ref Range    QuantiFERON-TB Gold Plus NEGATIVE NEGATIVE    NIL 0.03 IU/mL    Mitogen - Nil 9.02 IU/mL    TB1 - Nil 0.01 IU/mL    TB2 - Nil 0.00 IU/mL     Result Narrative   EXAM:  CT CHEST WO CONTRAST    CLINICAL HISTORY: Trauma.    TECHNIQUE: Routine chest CT protocol was performed without contrast. All CT scans at [this location] are performed using dose modulation techniques as appropriate to a performed exam including the following: automated exposure control; adjustment of the   mA and/or kV according to patient size (this includes techniques or standardized protocols for targeted exams where dose is matched to indication / reason for exam; i.e. extremities or head); use of iterative reconstruction technique.     COMPARISON: No prior CT is available for comparison.    FINDINGS:  No evidence of mediastinal hematoma. The external lumen of the aorta is smooth. No pleural effusion or pneumothorax. No endobronchial or endotracheal lesions are evident. There is no cardiomegaly or pericardial effusion. Coronary artery   calcifications. No suspicious or acute osseous abnormalities. Old posterior right ninth rib fracture. Limited images through the upper abdomen demonstrate no acute abnormality.      IMPRESSION:     No signs of traumatic injury to the chest.     Glomerular Filtration Rate (10/11/2018 3:42 PM CDT)  Glomerular Filtration Rate (10/11/2018 3:42 PM CDT)   Component Value Ref Range Performed At   GFR Non African American 37 (A) >59 mL/min     GFR  44 (A)  Comment:                 STAGES OF CHRONIC KIDNEY DISEASE  STAGE          DESCRIPTION           GFR(mL/min/1.73 m2)  3         Moderate decrease GFR            30-59  4           Severe decrease GFR            15-29  5              Kidney Failure         <15 (or dialysis)  Chronic kidney disease is defined as either kidney damage  or GFR <60mL/min/1.73 m2 for >=3 months. Kidney damage is  defined as pathologic abnormalities or markers of damage,  including abnormalities in blood or urine tests or imaging  studies.   >59 mL/min       Glomerular Filtration Rate (10/11/2018 3:42 PM CDT)   Specimen   Blood     Glomerular Filtration Rate (10/11/2018 3:42 PM CDT)   Performing Organization Address City/[facility] Phone Number   [place]             Back to top of Lab Results       Basic metabolic panel (10/11/2018 3:42 PM CDT)  Basic metabolic panel (10/11/2018 3:42 PM CDT)   Component Value Ref Range Performed At   Glucose 109 (H) 65 - 99 mg/dL     Sodium 136 136 - 144 mmol/L     Potassium 4.5 3.6 - 5.1 mmol/L     Chloride 101 101 - 111 mmol/L     CO2 26 22 - 32 mmol/L     BUN 17 8 - 20 mg/dL     Calcium 8.5 (L) 8.9 - 10.3 mg/dL     Creatinine 1.90 (H) 0.90 - 1.30 mg/dL     Anion Gap 9 7 - 16 mmol/L       Basic metabolic panel (10/11/2018 3:42 PM CDT)   Specimen   Blood - Blood     Basic metabolic panel (10/11/2018 3:42 PM CDT)   Performing Organization Address City/[facility] Phone Number   [place]             Back to top of Lab Results       CBC with Differential (10/11/2018 3:42 PM CDT)  CBC with Differential (10/11/2018 3:42 PM CDT)   Component Value Ref Range Performed At   WBC 10.0 4.8 - 10.8 10*3/uL     RBC 4.08 (L) 4.70 - 6.10 10*6/uL     HGB 13.0 (L) 14.0 - 18.0 g/dL     HCT 37.9 (L) 42.0 - 52.0 %     MCV 93.0 80.0 - 94.0 fL     MCH 32.0 (H) 27.0 - 31.0 pg     MCHC 34.4 33.0 - 37.0 g/dL     RDW 14.9 (H) 11.5 - 14.5 %     Platelet Count 154 130 - 400 10*3/uL     MPV 8.7 7.4 - 10.4 fL     Neutrophils Percent 77.0  (H) 36.0 - 66.0 %     Lymphocytes Percent 14.0 (L) 21.0 - 50.0 %     Monocytes Percent 8.0 2.0 - 10.0 %     Eosinophils Percent 1.0 0.0 - 10.0 %     Basophils Percent 1.0 0.0 - 1.0 %     Neutrophils Absolute 7.7 (H) 1.4 - 6.5 10*3/uL     Lymphocytes Absolute 1.4 1.2 - 3.4 10*3/uL     Monocytes Absolute 0.8 0.1 - 1.0 10*3/uL     Eosinophils Absolute 0.1 0.0 - 0.7 10*3/uL     Basophils Absolute 0.1           Assessment:         Encounter Diagnoses   Name Primary?    Rheumatoid arthritis of hand, unspecified laterality, unspecified rheumatoid factor presence Yes    Current chronic use of systemic steroids     Long-term use of Plaquenil     Immunocompromised state due to drug therapy          Plan:       Rheumatoid arthritis of hand, unspecified laterality, unspecified rheumatoid factor presence  -     methylPREDNISolone acetate injection 80 mg  -     CBC auto differential; Future; Expected date: 04/12/2019  -     Comprehensive metabolic panel; Future; Expected date: 04/12/2019  -     C-reactive protein; Future; Expected date: 04/12/2019  -     Sedimentation rate; Future; Expected date: 04/12/2019  -     Cyclic citrul peptide antibody, IgG; Future; Expected date: 04/12/2019  -     Rheumatoid factor; Future; Expected date: 04/12/2019    Current chronic use of systemic steroids  -     CBC auto differential; Future; Expected date: 04/12/2019  -     Comprehensive metabolic panel; Future; Expected date: 04/12/2019  -     C-reactive protein; Future; Expected date: 04/12/2019  -     Sedimentation rate; Future; Expected date: 04/12/2019  -     Cyclic citrul peptide antibody, IgG; Future; Expected date: 04/12/2019  -     Rheumatoid factor; Future; Expected date: 04/12/2019    Long-term use of Plaquenil  -     CBC auto differential; Future; Expected date: 04/12/2019  -     Comprehensive metabolic panel; Future; Expected date: 04/12/2019  -     C-reactive protein; Future; Expected date: 04/12/2019  -     Sedimentation rate;  Future; Expected date: 04/12/2019  -     Cyclic citrul peptide antibody, IgG; Future; Expected date: 04/12/2019  -     Rheumatoid factor; Future; Expected date: 04/12/2019    Immunocompromised state due to drug therapy  -     CBC auto differential; Future; Expected date: 04/12/2019  -     Comprehensive metabolic panel; Future; Expected date: 04/12/2019  -     C-reactive protein; Future; Expected date: 04/12/2019  -     Sedimentation rate; Future; Expected date: 04/12/2019  -     Cyclic citrul peptide antibody, IgG; Future; Expected date: 04/12/2019  -     Rheumatoid factor; Future; Expected date: 04/12/2019    Other orders  -     linaclotide (LINZESS) 145 mcg Cap capsule; Take 1 capsule (145 mcg total) by mouth once daily.  Dispense: 30 capsule; Refill: 5  -     simvastatin (ZOCOR) 40 MG tablet; Take 1 tablet (40 mg total) by mouth every evening.  Dispense: 90 tablet; Refill: 3  -     tamsulosin (FLOMAX) 0.4 mg Cap; Take 1 capsule (0.4 mg total) by mouth once daily.  Dispense: 30 capsule; Refill: 11  -     mirtazapine (REMERON) 45 MG tablet; Take 1 tablet (45 mg total) by mouth every evening.  Dispense: 30 tablet; Refill: 11  -     nitroGLYCERIN (NITROSTAT) 0.4 MG SL tablet; Place 1 tablet (0.4 mg total) under the tongue every 5 (five) minutes as needed for Chest pain.  Dispense: 25 tablet; Refill: 5    continue prolia, and humira and refer ortho  Hold maxide and labs and xrays today

## 2019-04-12 NOTE — PROGRESS NOTES
Administered 1 cc ( 80 mg/ml ) of depomedrol to the left upper outer gluteal. Informed of s/s to report verbalized understanding. No adverse reactions noted.    Lot # [id number]  Expiration 03/20

## 2019-04-16 RX ORDER — SIMVASTATIN 40 MG/1
40 TABLET, FILM COATED ORAL NIGHTLY
Qty: 30 TABLET | Refills: 4 | Status: SHIPPED | OUTPATIENT
Start: 2019-04-16

## 2019-04-16 RX ORDER — TAMSULOSIN HYDROCHLORIDE 0.4 MG/1
0.4 CAPSULE ORAL DAILY
Qty: 30 CAPSULE | Refills: 4 | Status: SHIPPED | OUTPATIENT
Start: 2019-04-16

## 2019-04-16 RX ORDER — MIRTAZAPINE 45 MG/1
45 TABLET, FILM COATED ORAL NIGHTLY
Qty: 30 TABLET | Refills: 11 | Status: SHIPPED | OUTPATIENT
Start: 2019-04-16 | End: 2020-04-15

## 2019-05-02 ENCOUNTER — CLINICAL SUPPORT (OUTPATIENT)
Dept: OPHTHALMOLOGY | Facility: CLINIC | Age: 59
End: 2019-05-02
Payer: MEDICARE

## 2019-05-02 ENCOUNTER — OFFICE VISIT (OUTPATIENT)
Dept: OPTOMETRY | Facility: CLINIC | Age: 59
End: 2019-05-02
Payer: MEDICARE

## 2019-05-02 DIAGNOSIS — Z79.899 LONG-TERM USE OF PLAQUENIL: ICD-10-CM

## 2019-05-02 DIAGNOSIS — H52.7 REFRACTIVE ERROR: ICD-10-CM

## 2019-05-02 DIAGNOSIS — M06.9 RHEUMATOID ARTHRITIS INVOLVING MULTIPLE SITES, UNSPECIFIED RHEUMATOID FACTOR PRESENCE: Primary | ICD-10-CM

## 2019-05-02 DIAGNOSIS — H25.13 NUCLEAR SCLEROSIS OF BOTH EYES: ICD-10-CM

## 2019-05-02 PROCEDURE — 92134 POSTERIOR SEGMENT OCT RETINA (OCULAR COHERENCE TOMOGRAPHY)-BOTH EYES: ICD-10-PCS | Mod: S$GLB,,, | Performed by: OPTOMETRIST

## 2019-05-02 PROCEDURE — 92082 INTERMEDIATE VISUAL FIELD XM: CPT | Mod: S$GLB,,, | Performed by: OPTOMETRIST

## 2019-05-02 PROCEDURE — 92082 HUMPHREY VISUAL FIELD-OU-INTERMEDIATE-BOTH EYES: ICD-10-PCS | Mod: S$GLB,,, | Performed by: OPTOMETRIST

## 2019-05-02 PROCEDURE — 99999 PR PBB SHADOW E&M-EST. PATIENT-LVL II: ICD-10-PCS | Mod: PBBFAC,,, | Performed by: OPTOMETRIST

## 2019-05-02 PROCEDURE — 92015 PR REFRACTION: ICD-10-PCS | Mod: S$GLB,,, | Performed by: OPTOMETRIST

## 2019-05-02 PROCEDURE — 92134 CPTRZ OPH DX IMG PST SGM RTA: CPT | Mod: S$GLB,,, | Performed by: OPTOMETRIST

## 2019-05-02 PROCEDURE — 92004 COMPRE OPH EXAM NEW PT 1/>: CPT | Mod: S$GLB,,, | Performed by: OPTOMETRIST

## 2019-05-02 PROCEDURE — 99999 PR PBB SHADOW E&M-EST. PATIENT-LVL II: CPT | Mod: PBBFAC,,, | Performed by: OPTOMETRIST

## 2019-05-02 PROCEDURE — 92015 DETERMINE REFRACTIVE STATE: CPT | Mod: S$GLB,,, | Performed by: OPTOMETRIST

## 2019-05-02 PROCEDURE — 92004 PR EYE EXAM, NEW PATIENT,COMPREHESV: ICD-10-PCS | Mod: S$GLB,,, | Performed by: OPTOMETRIST

## 2019-05-02 RX ORDER — CEPHALEXIN 500 MG/1
CAPSULE ORAL
COMMUNITY

## 2019-05-02 NOTE — PROGRESS NOTES
HPI     New pt here for routine eye exam, Pt states last eye exam was about 8   years ago and he has noticed some va changes while reading. He states   driving at night is especially hard for him with all the lights. Denies   eye pain. No new floaters in va. Pt states high bp is somewhat controlled   with meds. No gtts.   On plaquenil 200 2x/day x 5-10 years    Last edited by Sam Carreno, OD on 5/2/2019  9:33 AM. (History)            Assessment /Plan     For exam results, see Encounter Report.    Rheumatoid arthritis involving multiple sites, unspecified rheumatoid factor presence  -     OCT- Retina  -     Donaldson Visual Field - Intermediate - OU - Both Eyes    Long-term use of Plaquenil  -     OCT- Retina  -     Donaldson Visual Field - Intermediate - OU - Both Eyes    Refractive error    Nuclear sclerosis of both eyes      1,2. Normal HVf, OCt with some blurring out of PIL. RTC with Mazzulla retina to rule out plaq ret.   3. New Spec Rx given. Different lens options discussed with patient. RTC 1 year full exam.  4 Educated pt on presence of cataracts and effects on vision. No surgery at this time. Recheck in one year.

## 2019-06-03 ENCOUNTER — INITIAL CONSULT (OUTPATIENT)
Dept: OPHTHALMOLOGY | Facility: CLINIC | Age: 59
End: 2019-06-03
Payer: MEDICARE

## 2019-06-03 DIAGNOSIS — H35.389 TOXIC MACULOPATHY FROM PLAQUENIL IN THERAPEUTIC USE: Primary | ICD-10-CM

## 2019-06-03 DIAGNOSIS — T37.2X5A TOXIC MACULOPATHY FROM PLAQUENIL IN THERAPEUTIC USE: Primary | ICD-10-CM

## 2019-06-03 DIAGNOSIS — H35.89 RETINAL MACULAR ATROPHY: ICD-10-CM

## 2019-06-03 DIAGNOSIS — M05.79 RHEUMATOID ARTHRITIS INVOLVING MULTIPLE SITES WITH POSITIVE RHEUMATOID FACTOR: ICD-10-CM

## 2019-06-03 PROCEDURE — 92225 PR SPECIAL EYE EXAM, INITIAL: CPT | Mod: 50,S$GLB,, | Performed by: OPHTHALMOLOGY

## 2019-06-03 PROCEDURE — 92134 CPTRZ OPH DX IMG PST SGM RTA: CPT | Mod: S$GLB,,, | Performed by: OPHTHALMOLOGY

## 2019-06-03 PROCEDURE — 92014 COMPRE OPH EXAM EST PT 1/>: CPT | Mod: S$GLB,,, | Performed by: OPHTHALMOLOGY

## 2019-06-03 PROCEDURE — 99999 PR PBB SHADOW E&M-EST. PATIENT-LVL II: CPT | Mod: PBBFAC,,, | Performed by: OPHTHALMOLOGY

## 2019-06-03 PROCEDURE — 99999 PR PBB SHADOW E&M-EST. PATIENT-LVL II: ICD-10-PCS | Mod: PBBFAC,,, | Performed by: OPHTHALMOLOGY

## 2019-06-03 PROCEDURE — 92134 POSTERIOR SEGMENT OCT RETINA (OCULAR COHERENCE TOMOGRAPHY)-BOTH EYES: ICD-10-PCS | Mod: S$GLB,,, | Performed by: OPHTHALMOLOGY

## 2019-06-03 PROCEDURE — 92225 PR SPECIAL EYE EXAM, INITIAL: ICD-10-PCS | Mod: 50,S$GLB,, | Performed by: OPHTHALMOLOGY

## 2019-06-03 PROCEDURE — 92014 PR EYE EXAM, EST PATIENT,COMPREHESV: ICD-10-PCS | Mod: S$GLB,,, | Performed by: OPHTHALMOLOGY

## 2019-06-03 RX ORDER — LINACLOTIDE 145 UG/1
CAPSULE, GELATIN COATED ORAL
COMMUNITY
Start: 2019-05-13

## 2019-06-03 NOTE — Clinical Note
Binus, Dr. Calvillo, Mr. 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  I recommend cessation of therapy.  Please work with him to find an alternative agent.Dash, good .  This is a good one to share with the screening group regarding early disease.Vaughn

## 2019-06-03 NOTE — LETTER
Angeli 3, 2019      Sam Carreno, OD  [address]  [place] [address]           [facility]  [address] [facility] [address] [place] [address]  Phone: [phone]  Fax: [phone]          Patient: Anoop Womack   MR Number: [id number]   YOB: 1960   Date of Visit: 6/3/2019       Dear Dr. Sam Carreno:    Thank you for referring Anoop Womack to me for evaluation. Attached you will find relevant portions of my assessment and plan of care.    If you have questions, please do not hesitate to call me. I look forward to following Anoop Womack along with you.    Sincerely,    CHARLES Suero MD    Enclosure  CC:  No Recipients    If you would like to receive this communication electronically, please contact [email or web address] or [phone] to request more information on [employer] Link access.    For providers and/or their staff who would like to refer a patient to [facility], please contact us through our one-stop-shop provider referral line, [facility] , at [phone].    If you feel you have received this communication in error or would no longer like to receive these types of communications, please e-mail [email or web address]

## 2019-06-03 NOTE — PROGRESS NOTES
HPI     Concerns About Ocular Health      Additional comments: Plaquenil Eval- Dr. Carreno              Comments     Patient states he has more trouble with night time vision when he drives.    No gtts    Rheumatoid arthritis involving multiple sites, unspecified rheumatoid factor presence  Long-term use of Plaquenil  Refractive error  Nuclear sclerosis of both eyes         OCT - perifoveal thinning OU  HVF - some central defects OU      A/P    1. 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 Early NS OU      6 months OCT

## 2019-06-17 RX ORDER — PROMETHAZINE HYDROCHLORIDE 25 MG/1
TABLET ORAL
Qty: 60 TABLET | Refills: 5 | Status: SHIPPED | OUTPATIENT
Start: 2019-06-17

## 2019-06-21 DIAGNOSIS — M80.88XG OSTEOPOROTIC COMPRESSION FRACTURE OF SPINE, WITH DELAYED HEALING, SUBSEQUENT ENCOUNTER: Primary | ICD-10-CM

## 2019-06-28 ENCOUNTER — INFUSION (OUTPATIENT)
Dept: INFUSION THERAPY | Facility: HOSPITAL | Age: 59
End: 2019-06-28
Attending: INTERNAL MEDICINE
Payer: MEDICARE

## 2019-06-28 DIAGNOSIS — M80.88XG OSTEOPOROTIC COMPRESSION FRACTURE OF SPINE WITH DELAYED HEALING: Primary | ICD-10-CM

## 2019-06-28 PROCEDURE — 96372 THER/PROPH/DIAG INJ SC/IM: CPT | Mod: PN

## 2019-06-28 PROCEDURE — 63600175 PHARM REV CODE 636 W HCPCS: Mod: JG,PN | Performed by: PHYSICIAN ASSISTANT

## 2019-06-28 RX ADMIN — DENOSUMAB 60 MG: 60 INJECTION SUBCUTANEOUS at 04:06

## 2019-07-03 DIAGNOSIS — K52.9 GASTROENTERITIS: ICD-10-CM

## 2019-07-03 DIAGNOSIS — R14.0 ABDOMINAL DISTENSION (GASEOUS): ICD-10-CM

## 2019-07-03 DIAGNOSIS — K59.03 CONSTIPATION DUE TO OPIOID THERAPY: ICD-10-CM

## 2019-07-03 DIAGNOSIS — Z86.39 H/O NON ANEMIC VITAMIN B12 DEFICIENCY: ICD-10-CM

## 2019-07-03 DIAGNOSIS — M06.9 RHEUMATOID ARTHRITIS OF HAND, UNSPECIFIED LATERALITY, UNSPECIFIED RHEUMATOID FACTOR PRESENCE: ICD-10-CM

## 2019-07-03 DIAGNOSIS — G47.00 INSOMNIA: ICD-10-CM

## 2019-07-03 DIAGNOSIS — T40.2X5A CONSTIPATION DUE TO OPIOID THERAPY: ICD-10-CM

## 2019-07-03 DIAGNOSIS — E87.6 LOW BLOOD POTASSIUM: ICD-10-CM

## 2019-07-03 DIAGNOSIS — I10 ESSENTIAL HYPERTENSION: ICD-10-CM

## 2019-07-03 DIAGNOSIS — R19.5 YEAST IN STOOL: ICD-10-CM

## 2019-07-03 RX ORDER — ZOLPIDEM TARTRATE 10 MG/1
TABLET ORAL
Qty: 30 TABLET | Refills: 3 | Status: SHIPPED | OUTPATIENT
Start: 2019-07-03

## 2019-07-03 RX ORDER — POTASSIUM CHLORIDE 750 MG/1
TABLET, EXTENDED RELEASE ORAL
Qty: 30 TABLET | Refills: 3 | Status: SHIPPED | OUTPATIENT
Start: 2019-07-03